# Patient Record
Sex: MALE | Race: WHITE
[De-identification: names, ages, dates, MRNs, and addresses within clinical notes are randomized per-mention and may not be internally consistent; named-entity substitution may affect disease eponyms.]

---

## 2017-11-13 ENCOUNTER — HOSPITAL ENCOUNTER (INPATIENT)
Dept: HOSPITAL 17 - NEPE | Age: 57
LOS: 4 days | Discharge: HOME | DRG: 281 | End: 2017-11-17
Attending: HOSPITALIST | Admitting: HOSPITALIST
Payer: COMMERCIAL

## 2017-11-13 VITALS
DIASTOLIC BLOOD PRESSURE: 103 MMHG | RESPIRATION RATE: 18 BRPM | OXYGEN SATURATION: 95 % | TEMPERATURE: 97.8 F | HEART RATE: 94 BPM | SYSTOLIC BLOOD PRESSURE: 155 MMHG

## 2017-11-13 VITALS — WEIGHT: 271.17 LBS | BODY MASS INDEX: 42.56 KG/M2 | HEIGHT: 67 IN

## 2017-11-13 DIAGNOSIS — R09.89: ICD-10-CM

## 2017-11-13 DIAGNOSIS — I25.82: ICD-10-CM

## 2017-11-13 DIAGNOSIS — I42.0: ICD-10-CM

## 2017-11-13 DIAGNOSIS — I21.4: Primary | ICD-10-CM

## 2017-11-13 DIAGNOSIS — I25.2: ICD-10-CM

## 2017-11-13 DIAGNOSIS — Z95.5: ICD-10-CM

## 2017-11-13 DIAGNOSIS — E78.2: ICD-10-CM

## 2017-11-13 DIAGNOSIS — E11.9: ICD-10-CM

## 2017-11-13 DIAGNOSIS — I25.110: ICD-10-CM

## 2017-11-13 DIAGNOSIS — F17.210: ICD-10-CM

## 2017-11-13 DIAGNOSIS — Z91.14: ICD-10-CM

## 2017-11-13 DIAGNOSIS — I10: ICD-10-CM

## 2017-11-13 DIAGNOSIS — Z91.19: ICD-10-CM

## 2017-11-13 PROCEDURE — 85610 PROTHROMBIN TIME: CPT

## 2017-11-13 PROCEDURE — 84439 ASSAY OF FREE THYROXINE: CPT

## 2017-11-13 PROCEDURE — 85027 COMPLETE CBC AUTOMATED: CPT

## 2017-11-13 PROCEDURE — 84484 ASSAY OF TROPONIN QUANT: CPT

## 2017-11-13 PROCEDURE — 82948 REAGENT STRIP/BLOOD GLUCOSE: CPT

## 2017-11-13 PROCEDURE — 93458 L HRT ARTERY/VENTRICLE ANGIO: CPT

## 2017-11-13 PROCEDURE — 85730 THROMBOPLASTIN TIME PARTIAL: CPT

## 2017-11-13 PROCEDURE — 84100 ASSAY OF PHOSPHORUS: CPT

## 2017-11-13 PROCEDURE — 80061 LIPID PANEL: CPT

## 2017-11-13 PROCEDURE — C1893 INTRO/SHEATH, FIXED,NON-PEEL: HCPCS

## 2017-11-13 PROCEDURE — 84443 ASSAY THYROID STIM HORMONE: CPT

## 2017-11-13 PROCEDURE — 93005 ELECTROCARDIOGRAM TRACING: CPT

## 2017-11-13 PROCEDURE — 96374 THER/PROPH/DIAG INJ IV PUSH: CPT

## 2017-11-13 PROCEDURE — 83880 ASSAY OF NATRIURETIC PEPTIDE: CPT

## 2017-11-13 PROCEDURE — 83036 HEMOGLOBIN GLYCOSYLATED A1C: CPT

## 2017-11-13 PROCEDURE — 83735 ASSAY OF MAGNESIUM: CPT

## 2017-11-13 PROCEDURE — 85025 COMPLETE CBC W/AUTO DIFF WBC: CPT

## 2017-11-13 PROCEDURE — 80053 COMPREHEN METABOLIC PANEL: CPT

## 2017-11-13 PROCEDURE — 71010: CPT

## 2017-11-13 PROCEDURE — 82550 ASSAY OF CK (CPK): CPT

## 2017-11-13 PROCEDURE — C1769 GUIDE WIRE: HCPCS

## 2017-11-13 PROCEDURE — 82552 ASSAY OF CPK IN BLOOD: CPT

## 2017-11-13 PROCEDURE — 99152 MOD SED SAME PHYS/QHP 5/>YRS: CPT

## 2017-11-13 PROCEDURE — 80048 BASIC METABOLIC PNL TOTAL CA: CPT

## 2017-11-13 PROCEDURE — 99153 MOD SED SAME PHYS/QHP EA: CPT

## 2017-11-14 VITALS — HEART RATE: 84 BPM

## 2017-11-14 VITALS
OXYGEN SATURATION: 96 % | SYSTOLIC BLOOD PRESSURE: 139 MMHG | RESPIRATION RATE: 18 BRPM | DIASTOLIC BLOOD PRESSURE: 96 MMHG | HEART RATE: 71 BPM | TEMPERATURE: 98.6 F

## 2017-11-14 VITALS
OXYGEN SATURATION: 100 % | RESPIRATION RATE: 18 BRPM | SYSTOLIC BLOOD PRESSURE: 143 MMHG | HEART RATE: 73 BPM | DIASTOLIC BLOOD PRESSURE: 93 MMHG

## 2017-11-14 VITALS
DIASTOLIC BLOOD PRESSURE: 93 MMHG | RESPIRATION RATE: 18 BRPM | OXYGEN SATURATION: 97 % | HEART RATE: 71 BPM | SYSTOLIC BLOOD PRESSURE: 143 MMHG

## 2017-11-14 VITALS
DIASTOLIC BLOOD PRESSURE: 79 MMHG | OXYGEN SATURATION: 96 % | SYSTOLIC BLOOD PRESSURE: 132 MMHG | HEART RATE: 76 BPM | RESPIRATION RATE: 18 BRPM | TEMPERATURE: 98.4 F

## 2017-11-14 VITALS
HEART RATE: 87 BPM | SYSTOLIC BLOOD PRESSURE: 132 MMHG | DIASTOLIC BLOOD PRESSURE: 74 MMHG | TEMPERATURE: 97.3 F | RESPIRATION RATE: 16 BRPM | OXYGEN SATURATION: 96 %

## 2017-11-14 VITALS
HEART RATE: 77 BPM | SYSTOLIC BLOOD PRESSURE: 95 MMHG | DIASTOLIC BLOOD PRESSURE: 48 MMHG | TEMPERATURE: 99.4 F | OXYGEN SATURATION: 96 % | RESPIRATION RATE: 18 BRPM

## 2017-11-14 VITALS — HEART RATE: 96 BPM

## 2017-11-14 VITALS
DIASTOLIC BLOOD PRESSURE: 84 MMHG | TEMPERATURE: 99.2 F | OXYGEN SATURATION: 94 % | HEART RATE: 79 BPM | SYSTOLIC BLOOD PRESSURE: 127 MMHG | RESPIRATION RATE: 18 BRPM

## 2017-11-14 VITALS
TEMPERATURE: 98.4 F | HEART RATE: 74 BPM | RESPIRATION RATE: 16 BRPM | SYSTOLIC BLOOD PRESSURE: 136 MMHG | OXYGEN SATURATION: 97 % | DIASTOLIC BLOOD PRESSURE: 85 MMHG

## 2017-11-14 VITALS — HEART RATE: 78 BPM

## 2017-11-14 VITALS — HEART RATE: 74 BPM

## 2017-11-14 VITALS — OXYGEN SATURATION: 98 %

## 2017-11-14 VITALS — HEART RATE: 77 BPM

## 2017-11-14 VITALS — HEART RATE: 73 BPM

## 2017-11-14 VITALS — HEART RATE: 97 BPM

## 2017-11-14 VITALS — HEART RATE: 82 BPM

## 2017-11-14 LAB
ANION GAP SERPL CALC-SCNC: 9 MEQ/L (ref 5–15)
APTT BLD: 28 SEC (ref 24.3–30.1)
APTT BLD: 28.7 SEC (ref 24.3–30.1)
APTT BLD: 29.1 SEC (ref 24.3–30.1)
BASOPHILS # BLD AUTO: 0.1 TH/MM3 (ref 0–0.2)
BASOPHILS NFR BLD: 0.8 % (ref 0–2)
BUN SERPL-MCNC: 13 MG/DL (ref 7–18)
CHLORIDE SERPL-SCNC: 103 MEQ/L (ref 98–107)
CK MB SERPL-MCNC: 2.7 NG/ML (ref 0.5–3.6)
CK SERPL-CCNC: 103 U/L (ref 39–308)
CK SERPL-CCNC: 143 U/L (ref 39–308)
CK SERPL-CCNC: 87 U/L (ref 39–308)
EOSINOPHIL # BLD: 0.3 TH/MM3 (ref 0–0.4)
EOSINOPHIL NFR BLD: 3.1 % (ref 0–4)
ERYTHROCYTE [DISTWIDTH] IN BLOOD BY AUTOMATED COUNT: 12.9 % (ref 11.6–17.2)
ERYTHROCYTE [DISTWIDTH] IN BLOOD BY AUTOMATED COUNT: 13 % (ref 11.6–17.2)
GFR SERPLBLD BASED ON 1.73 SQ M-ARVRAT: 99 ML/MIN (ref 89–?)
HCO3 BLD-SCNC: 24.8 MEQ/L (ref 21–32)
HCT VFR BLD CALC: 44.2 % (ref 39–51)
HCT VFR BLD CALC: 47.4 % (ref 39–51)
HDLC SERPL-MCNC: 51.6 MG/DL (ref 40–60)
HEMO FLAGS: (no result)
HEMOGLOBIN A1A: 1.3 %
HEMOGLOBIN A1B: 1.2 %
HEMOGLOBIN AO: 80.3 %
HEMOGLOBIN LA1C: 2.4 %
HEMOGLOBIN P3: 4.2 %
HGB F MFR BLD: 1.5 %
INR PPP: 1 RATIO
LDLC SERPL-MCNC: 111 MG/DL (ref 0–99)
LYMPHOCYTES # BLD AUTO: 3.1 TH/MM3 (ref 1–4.8)
LYMPHOCYTES NFR BLD AUTO: 34.7 % (ref 9–44)
MAGNESIUM SERPL-MCNC: 1.9 MG/DL (ref 1.5–2.5)
MCH RBC QN AUTO: 32.8 PG (ref 27–34)
MCH RBC QN AUTO: 33.6 PG (ref 27–34)
MCHC RBC AUTO-ENTMCNC: 34.2 % (ref 32–36)
MCHC RBC AUTO-ENTMCNC: 34.6 % (ref 32–36)
MCV RBC AUTO: 95.9 FL (ref 80–100)
MCV RBC AUTO: 97.2 FL (ref 80–100)
MONOCYTES NFR BLD: 8.9 % (ref 0–8)
NEUTROPHILS # BLD AUTO: 4.7 TH/MM3 (ref 1.8–7.7)
NEUTROPHILS NFR BLD AUTO: 52.5 % (ref 16–70)
PLATELET # BLD: 280 TH/MM3 (ref 150–450)
PLATELET # BLD: 294 TH/MM3 (ref 150–450)
POTASSIUM SERPL-SCNC: 4.1 MEQ/L (ref 3.5–5.1)
PROTHROMBIN TIME: 11.3 SEC (ref 9.8–11.6)
RBC # BLD AUTO: 4.54 MIL/MM3 (ref 4.5–5.9)
RBC # BLD AUTO: 4.95 MIL/MM3 (ref 4.5–5.9)
REVIEW FLAG: (no result)
SODIUM SERPL-SCNC: 137 MEQ/L (ref 136–145)
WBC # BLD AUTO: 8.7 TH/MM3 (ref 4–11)
WBC # BLD AUTO: 8.9 TH/MM3 (ref 4–11)

## 2017-11-14 RX ADMIN — NITROGLYCERIN SCH INCH: 20 OINTMENT TOPICAL at 12:00

## 2017-11-14 RX ADMIN — INSULIN ASPART SCH: 100 INJECTION, SOLUTION INTRAVENOUS; SUBCUTANEOUS at 21:33

## 2017-11-14 RX ADMIN — Medication SCH ML: at 21:32

## 2017-11-14 RX ADMIN — PHENYTOIN SODIUM SCH MLS/HR: 50 INJECTION INTRAMUSCULAR; INTRAVENOUS at 17:00

## 2017-11-14 RX ADMIN — ATORVASTATIN CALCIUM SCH MG: 10 TABLET, FILM COATED ORAL at 21:33

## 2017-11-14 RX ADMIN — Medication SCH ML: at 09:00

## 2017-11-14 RX ADMIN — NITROGLYCERIN SCH INCH: 20 OINTMENT TOPICAL at 08:00

## 2017-11-14 RX ADMIN — CARVEDILOL SCH MG: 3.12 TABLET, FILM COATED ORAL at 21:33

## 2017-11-14 RX ADMIN — NITROGLYCERIN SCH INCH: 20 OINTMENT TOPICAL at 18:00

## 2017-11-14 RX ADMIN — INSULIN ASPART SCH: 100 INJECTION, SOLUTION INTRAVENOUS; SUBCUTANEOUS at 17:00

## 2017-11-14 RX ADMIN — INSULIN ASPART SCH: 100 INJECTION, SOLUTION INTRAVENOUS; SUBCUTANEOUS at 08:00

## 2017-11-14 RX ADMIN — INSULIN ASPART SCH: 100 INJECTION, SOLUTION INTRAVENOUS; SUBCUTANEOUS at 12:00

## 2017-11-14 RX ADMIN — ENALAPRIL MALEATE SCH MG: 2.5 TABLET ORAL at 21:33

## 2017-11-14 NOTE — EKG
Date Performed: 11/14/2017       Time Performed: 05:53:16

 

PTAGE:      57 years

 

EKG:      Sinus rhythm 

 

 Lateral T wave changes may be due to myocardial ischemia Abnormal ECG

 

PREVIOUS TRACING       : 11/14/2017 00.17 Compared to prior tracing no significant change

 

DOCTOR:   Brendan Cooper  Interpretating Date/Time  11/14/2017 14:01:55

## 2017-11-14 NOTE — EKG
Date Performed: 11/14/2017       Time Performed: 00:17:13

 

PTAGE:      57 years

 

EKG:      Sinus rhythm 

 

 SEPTAL MYOCARDIAL INFARCTION ABNORMAL ECG 

 

NO PREVIOUS TRACING            

 

DOCTOR:   Brendan Cooper  Interpretating Date/Time  11/14/2017 14:05:19

## 2017-11-14 NOTE — PD
HPI


Chief Complaint:  Chest Pain


Time Seen by Provider:  23:53


Travel History


International Travel<30 days:  No


Contact w/Intl Traveler<30days:  No


Traveled to known affect area:  No





History of Present Illness


HPI


This is a 57-year-old male with history of hypertension, diabetes mellitus, 

coronary artery disease, who presents today with points of intermittent chest 

pain with associated shortness of breath.  Patient states that yesterday he did 

heavy exertional activity while moving into a new residence.  He states he felt 

fine however this morning when he woke up, he was having shortness of breath 

and chest pressure.  He reports that his chest pressure around the base of his 

chest.  He states he went to bed last night and woke up with worsening 

shortness of breath.  The patient states he's been off of his medications for 

quite some time.  He states with his new insurance he has an extremely high 

deductible that makes it difficult for him to purchase his meds.





PFSH


Past Medical History


Diabetes:  Yes


Patient Takes Glucophage:  No


Diminished Hearing:  No


Medical other:  Yes (cranioplasy in 87 and blood clot removed )


Integumentary:  Yes (tumor removed between shoulder blade and L lung)


Myocardial Infarction:  Yes (2011)


Tetanus Vaccination:  > 5 Years





Past Surgical History


Abdominal Surgery:  Yes (appendectomy )


Appendectomy:  Yes


Cardiac Surgery:  Yes (1 stent 2011)


Coronary Stent:  Yes (2011)





Social History


Alcohol Use:  Yes (occassionally)


Tobacco Use:  Yes (1ppd)


Substance Use:  No





Allergies-Medications


(Allergen,Severity, Reaction):  


Coded Allergies:  


     No Known Allergies (Unverified , 11/14/17)





Review of Systems


Except as stated in HPI:  all other systems reviewed are Neg


General / Constitutional:  No: Fever, Chills


HENT:  No: Headaches, Neck Pain


Cardiovascular:  Positive: Chest Pain or Discomfort (tightness across the base 

of his), No: Palpitations ( chest bilaterally.), Irregular Rhythm


Respiratory:  Positive: Shortness of Breath, No: Cough


Gastrointestinal:  No: Nausea, Vomiting, Abdominal Pain


Musculoskeletal:  No: Weakness, Pain


Skin:  No Rash, No Itching


Neurologic:  No: Weakness, Dizziness, Headache


Psychiatric:  No: Anxiety, Depression





Physical Exam


Narrative


GENERAL: Well developed well-nourished male in no acute rest her distress


SKIN: Focused skin assessment warm/dry.


HEAD: Atraumatic. Normocephalic. 


EYES:No scleral icterus. No injection or drainage. 


ENT: No nasal bleeding or discharge.  Mucous membranes pink and moist.


NECK: Trachea midline.  Supple.


CARDIOVASCULAR: Regular rate and rhythm.  No murmur appreciated.


RESPIRATORY: No accessory muscle use. Clear to auscultation. Breath sounds 

equal bilaterally. 


GASTROINTESTINAL: Abdomen soft, non-tender, nondistended. Hepatic and splenic 

margins not palpable. 


MUSCULOSKELETAL: No obvious deformities. No clubbing.  No cyanosis.  No edema. 


NEUROLOGICAL: Awake and alert. No obvious cranial nerve deficits.  Motor 

grossly within normal limits. Normal speech.


PSYCHIATRIC: Appropriate mood and affect; insight and judgment normal.





Data


Data


Last Documented VS





Vital Signs








  Date Time  Temp Pulse Resp B/P (MAP) Pulse Ox O2 Delivery O2 Flow Rate FiO2


 


11/14/17 00:23     98 Nasal Cannula 2.00 


 


11/14/17 00:23   25     


 


11/13/17 23:28 97.8 94      








Orders





 Orders


Electrocardiogram (11/13/17 23:53)


Basic Metabolic Panel (Bmp) (11/13/17 23:53)


Ckmb (Isoenzyme) Profile (11/13/17 23:53)


Complete Blood Count With Diff (11/13/17 23:53)


Magnesium (Mg) (11/13/17 23:53)


Prothrombin Time / Inr (Pt) (11/13/17 23:53)


Act Partial Throm Time (Ptt) (11/13/17 23:53)


Troponin I (11/13/17 23:53)


Chest, Single Ap (11/13/17 23:53)


Ecg Monitoring (11/13/17 23:53)


Bilateral Bp Monitoring (11/13/17 23:53)


Iv Access Insert/Monitor (11/13/17 23:53)


Oximetry (11/13/17 23:53)


Oxygen Administration (11/13/17 23:53)


Aspirin Chew (Aspirin Chew) (11/14/17 00:15)


CKMB (11/14/17 00:08)


CKMB% (11/14/17 00:08)


Heparin Infusion SARAH.Q1H (11/14/17 01:33)


Heparin Inj (Heparin Inj) (11/14/17 01:45)


Heparin-D5w 25,000 U/250 Ml (Heparin-D5w (11/14/17 01:45)


Act Partial Throm Time (Ptt) (11/14/17 01:33)


Cbc No Diff, Includes Plts (11/14/17 01:33)


Cbc No Diff, Includes Plts (11/17/17 06:00)


Act Partial Throm Time (Ptt) (11/14/17 08:33)


Occult Blood (Hemoccult) Stool (11/14/17 01:33)


Nitroglycerin 2% Oint (Nitroglycerin 2% (11/14/17 02:00)





Labs





Laboratory Tests








Test


  11/14/17


00:08


 


White Blood Count 8.9 TH/MM3 


 


Red Blood Count 4.95 MIL/MM3 


 


Hemoglobin 16.2 GM/DL 


 


Hematocrit 47.4 % 


 


Mean Corpuscular Volume 95.9 FL 


 


Mean Corpuscular Hemoglobin 32.8 PG 


 


Mean Corpuscular Hemoglobin


Concent 34.2 % 


 


 


Red Cell Distribution Width 13.0 % 


 


Platelet Count 294 TH/MM3 


 


Mean Platelet Volume 7.3 FL 


 


Neutrophils (%) (Auto) 52.5 % 


 


Lymphocytes (%) (Auto) 34.7 % 


 


Monocytes (%) (Auto) 8.9 % 


 


Eosinophils (%) (Auto) 3.1 % 


 


Basophils (%) (Auto) 0.8 % 


 


Neutrophils # (Auto) 4.7 TH/MM3 


 


Lymphocytes # (Auto) 3.1 TH/MM3 


 


Monocytes # (Auto) 0.8 TH/MM3 


 


Eosinophils # (Auto) 0.3 TH/MM3 


 


Basophils # (Auto) 0.1 TH/MM3 


 


CBC Comment DIFF FINAL 


 


Differential Comment  


 


Prothrombin Time 11.3 SEC 


 


Prothromb Time International


Ratio 1.0 RATIO 


 


 


Activated Partial


Thromboplast Time 28.7 SEC 


 


 


Blood Urea Nitrogen 13 MG/DL 


 


Creatinine 0.81 MG/DL 


 


Random Glucose 187 MG/DL 


 


Calcium Level 8.2 MG/DL 


 


Magnesium Level 1.9 MG/DL 


 


Sodium Level 137 MEQ/L 


 


Potassium Level 4.1 MEQ/L 


 


Chloride Level 103 MEQ/L 


 


Carbon Dioxide Level 24.8 MEQ/L 


 


Anion Gap 9 MEQ/L 


 


Estimat Glomerular Filtration


Rate 99 ML/MIN 


 


 


Total Creatine Kinase 143 U/L 


 


Creatine Kinase MB 2.7 NG/ML 


 


Troponin I 0.39 NG/ML 











MDM


Medical Decision Making


Medical Screen Exam Complete:  Yes


Emergency Medical Condition:  Yes


Interpretation(s)





Laboratory Tests








Test


  11/14/17


00:08


 


White Blood Count 8.9 TH/MM3 


 


Red Blood Count 4.95 MIL/MM3 


 


Hemoglobin 16.2 GM/DL 


 


Hematocrit 47.4 % 


 


Mean Corpuscular Volume 95.9 FL 


 


Mean Corpuscular Hemoglobin 32.8 PG 


 


Mean Corpuscular Hemoglobin


Concent 34.2 % 


 


 


Red Cell Distribution Width 13.0 % 


 


Platelet Count 294 TH/MM3 


 


Mean Platelet Volume 7.3 FL 


 


Neutrophils (%) (Auto) 52.5 % 


 


Lymphocytes (%) (Auto) 34.7 % 


 


Monocytes (%) (Auto) 8.9 % 


 


Eosinophils (%) (Auto) 3.1 % 


 


Basophils (%) (Auto) 0.8 % 


 


Neutrophils # (Auto) 4.7 TH/MM3 


 


Lymphocytes # (Auto) 3.1 TH/MM3 


 


Monocytes # (Auto) 0.8 TH/MM3 


 


Eosinophils # (Auto) 0.3 TH/MM3 


 


Basophils # (Auto) 0.1 TH/MM3 


 


CBC Comment DIFF FINAL 


 


Differential Comment  


 


Prothrombin Time 11.3 SEC 


 


Prothromb Time International


Ratio 1.0 RATIO 


 


 


Activated Partial


Thromboplast Time 28.7 SEC 


 


 


Blood Urea Nitrogen 13 MG/DL 


 


Creatinine 0.81 MG/DL 


 


Random Glucose 187 MG/DL 


 


Calcium Level 8.2 MG/DL 


 


Magnesium Level 1.9 MG/DL 


 


Sodium Level 137 MEQ/L 


 


Potassium Level 4.1 MEQ/L 


 


Chloride Level 103 MEQ/L 


 


Carbon Dioxide Level 24.8 MEQ/L 


 


Anion Gap 9 MEQ/L 


 


Estimat Glomerular Filtration


Rate 99 ML/MIN 


 


 


Total Creatine Kinase 143 U/L 


 


Creatine Kinase MB 2.7 NG/ML 


 


Troponin I 0.39 NG/ML 








Last 24 hours Impressions








Chest X-Ray 11/13/17 5990 Signed





Impressions: 





 Service Date/Time:  Tuesday, November 14, 2017 00:10 - CONCLUSION:  Mild 





 pulmonary vascular congestion. Surgical clips overlie left chest. Some bony 





 hypertrophy of the right AC joint.     Jesse Segundo MD 








Differential Diagnosis


ACS versus musculoskeletal pain versus dyspnea versus COPD


Narrative Course


This is a 61-year-old male history coronary artery disease, hypertension, 

diabetes mellitus, who is noncompliant with his medications secondary to cost, 

presents today with complaint of day and a half history of shortness of breath.

  The patient also having some chest tightness across the base of his chest.  

The patient has no acute ST elevation or depression on his EKG.  Troponin is 

elevated at 0.39.  He's been given a full aspirin.  He's been started on a 

heparin drip.  The bit into the nitroglycerin placed based on his anterior 

chest wall.  Call was made to Dr. Cruz for admission for a non-ST elevation 

MI.  He'll be admitted to the intermediate floor and telemetry.





Diagnosis





 Primary Impression:  


 Non-ST elevation myocardial infarction (NSTEMI)


 Additional Impressions:  


 Diabetes mellitus


 Hypertension


 Tobacco use





Admitting Information


Admitting Physician Requests:  Admit











Kurt Noonan MD Nov 14, 2017 00:08
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
## 2017-11-14 NOTE — MB
cc:

LYNDON DAUGHERTY

****

 

 

DATE OF CONSULTATION

11/14/17

 

REASON FOR CONSULTATION

Unstable angina.

 

HISTORY OF PRESENT ILLNESS

The patient is a 57-year-old white male with a history of hypertension,

hyperlipidemia, diabetes, coronary artery disease status post myocardial

infarction and coronary stent placement 2011 in [place] who presented to the

hospital with chest pain.  On the day of admission, while doing minimal to mild

exertion, he developed severe substernal chest discomfort described as

"pressure" associated with severe shortness of breath.  The episode lasted

about 30 minutes but  recurred a short time later, again associated with severe

shortness of breath.  He finally decided to go to the emergency room for

further evaluation and treatment.  The second episode of chest discomfort also

lasted about 30-40 minutes.  He denies pleurisy, dizziness, syncope,

near-syncope, palpitations, pedal edema, paroxysmal nocturnal dyspnea.

 

PAST MEDICAL HISTORY

As above.  No other details currently available.

 

PAST SURGICAL HISTORY

1.  Appendectomy

2.  Benign tumor removed from the shoulder.

 

MEDICATIONS

At home none.

 

Here in hospital

1.  Aspirin 325 mg p.o. daily.

2.  Carvedilol 3.125 mg p.o. b.i.d.

3.  Atorvastatin 10 mg p.o. q.h.s.

4.  Nitro paste 1 inch q.6 h.

5.  Heparin drip.

 

ALLERGIES

NO KNOWN DRUG ALLERGIES.

 

FAMILY HISTORY

Noncontributory.   There is no significant family history of early myocardial

infarction.

 

SOCIAL HISTORY

The patient smokes about a pack of cigarettes per day.  He denies drug or

alcohol abuse.

 

REVIEW OF SYSTEMS

As in the history of present illness, otherwise, negative or noncontributory.

He also denies headache, visual changes, numbness, abdominal pain, melena,

dyspepsia, bright red blood per rectum.

 

PHYSICAL EXAMINATION

VITAL SIGNS:  Blood pressure 132/74 with a pulse of 97, respirations 16.

GENERAL:  He is a well-developed, well-nourished white male in no acute

distress

HEENT: Jugular venous pressure is very hard to assess.  Carotid pulses are 2+

bilaterally and without bruits.

CHEST:  Examination of the chest reveals clear lung fields.

CARDIAC:  He has a regular rhythm and rate without S3-S4 or murmur.

ABDOMEN:  He has a soft, obese, nontender abdomen.  Bowel sounds are present.

There is no definite hepatosplenomegaly.  EXTREMITIES:  No clubbing, cyanosis

or edema.  Peripheral pulses are normal throughout.

 

LABORATORY DATA

Normal CBC. Normal basic metabolic profile except for glucose 187. Troponin

0.39, , total cholesterol 209, , HDL 52, triglycerides 233.

 

IMAGING STUDIES

Chest x-ray shows mild pulmonary vascular congestion.

 

CARDIOLOGY STUDIES

EKG shows sinus rhythm, septal infarct age undetermined.

 

IMPRESSION

Symptoms most suggestive of unstable angina in this 57-year-old white male with

a history of known coronary artery disease status post myocardial infarction

and coronary stenting several years ago, history of hypertension, diabetes,

hyperlipidemia.  His chest pain symptoms on the day of admission clearly are

suggestive of angina in an unstable pattern, occurring with minimal exertion.

Troponin levels are also slightly abnormal.  EKGs overall show non-diagnostic 
lateral

T-wave changes.  He has been chest pain free since coming into hospital.  Chest

x-ray also suggests the possibility of slight to mild congestive heart failure.

Echocardiogram is pending.

 

RECOMMENDATIONS

1.  Cardiac catheterization tomorrow.

2.  Continue statin therapy.

3.  Continue beta blocker therapy and add an ACE inhibitor.

4.  Continue daily aspirin and heparin drip.

 

 

 

                              _________________________________

                              MD DANIELA Ambrocio/

D:  11/14/2017/5:02 PM

T:  11/14/2017/5:09 PM

Visit #:  [id number]

Job #:  [id number]

MTDD

## 2017-11-14 NOTE — RADRPT
EXAM DATE/TIME:  11/14/2017 00:10 

 

[facility] COMPARISON:     

No previous studies available for comparison.

 

                     

INDICATIONS :     

Chest pain and short of breath.

                     

 

MEDICAL HISTORY :     

None.          

 

SURGICAL HISTORY :        

Stent placement.

 

ENCOUNTER:     

Initial                                        

 

ACUITY:     

1 day      

 

PAIN SCORE:     

4/10

 

LOCATION:     

Bilateral chest 

 

FINDINGS:     

A single view of the chest demonstrates the lungs to be symmetrically aerated without evidence of mas
s, infiltrate or effusion. Mild pulmonary vascular prominence. Surgical clips overlie the left chest 
The cardiomediastinal contours are unremarkable.  Osseous structures are intact.

 

CONCLUSION:     

Mild pulmonary vascular congestion. Surgical clips overlie left chest. Some bony hypertrophy of the r
ight AC joint.

 

 

 

 Jesse Segundo MD on November 14, 2017 at 0:23           

Board Certified Radiologist.

 This report was verified electronically.

## 2017-11-14 NOTE — HHI.PR
Subjective


Remarks


Follow-up non-ST elevation MI


11/14/17-patient seen and examined and currently he is free of chest pain





Objective


Vitals





Vital Signs








  Date Time  Temp Pulse Resp B/P (MAP) Pulse Ox O2 Delivery O2 Flow Rate FiO2


 


11/14/17 09:00  77      


 


11/14/17 08:00  74      


 


11/14/17 07:00 98.4 74 16 136/85 (102) 97   


 


11/14/17 07:00  72      


 


11/14/17 06:00  74      


 


11/14/17 05:00  73      


 


11/14/17 04:00  73      


 


11/14/17 03:46        


 


11/14/17 03:40 98.6 71 18 139/96 (110) 96   


 


11/14/17 03:40  71      


 


11/14/17 02:19  71 18 143/93 (110) 97 Nasal Cannula 2.00 


 


11/14/17 02:14  73 18 143/93 (110) 100 Nasal Cannula 2.00 


 


11/14/17 00:23     98 Nasal Cannula 2.00 


 


11/14/17 00:23     97 Nasal Cannula 2.00 


 


11/14/17 00:23   25  100 Nasal Cannula 2.00 


 


11/13/17 23:28 97.8 94 18 155/103 (120) 95 Room Air  














I/O      


 


 11/13/17 11/13/17 11/13/17 11/14/17 11/14/17 11/14/17





 07:00 15:00 23:00 07:00 15:00 23:00


 


Intake Total    40 ml  


 


Output Total    400 ml  


 


Balance    -360 ml  


 


      


 


Intake Oral    0 ml  


 


IV Total    40 ml  


 


Output Urine Total    400 ml  


 


# Bowel Movements    0  








Result Diagram:  


11/14/17 0940                                                                  

              11/14/17 0008





Imaging





Last Impressions








Chest X-Ray 11/13/17 8554 Signed





Impressions: 





 Service Date/Time:  Tuesday, November 14, 2017 00:10 - CONCLUSION:  Mild 





 pulmonary vascular congestion. Surgical clips overlie left chest. Some bony 





 hypertrophy of the right AC joint.     Jesse Segundo MD 








Objective Remarks


GENERAL: NAD


SKIN: Warm and dry.


HEAD: Normocephalic.


EYES: No scleral icterus. No injection or drainage. 


NECK: Supple, trachea midline. No JVD or lymphadenopathy.


CARDIOVASCULAR: Regular rate and rhythm without murmurs, gallops, or rubs. 


RESPIRATORY: Breath sounds equal bilaterally. No accessory muscle use.


GASTROINTESTINAL: Abdomen soft, non-tender, nondistended. 


MUSCULOSKELETAL: No cyanosis, or edema. 


BACK: Nontender without obvious deformity. No CVA tenderness.








A/P


Problem List:  


(1) Non-ST elevation myocardial infarction (NSTEMI)


ICD Code:  I21.4 - Non-ST elevation (NSTEMI) myocardial infarction


Status:  Acute


(2) Hypertension


ICD Code:  I10 - Essential (primary) hypertension


Status:  Acute


(3) Diabetes mellitus


ICD Code:  E11.9 - Type 2 diabetes mellitus without complications


Status:  Acute


(4) Tobacco use


ICD Code:  Z72.0 - Tobacco use


Status:  Acute


Assessment and Plan


57-year-old man with





1.  NSTEMI


Continue with ACS rule out including serial troponin/EKG 


Currently on Heparin drip, Nitropaste, beta blocker, statin, aspirin


Cardiology consulted for possible evaluation for left heart catheterization





2.  Shortness of breath


Likely secondary to NSTEMI


Chest x-ray significant for mild pulmonary vascular congestion


Status post IV Lasix 1


Supplemental oxygen





3.  Diabetes mellitus


Not currently on any medication


A1c pending


Continue with SSI





4.  Hypertension


Start Coreg twice a day





5. Hyperlipidemia





Start Lipitor at bedtime











Chuck Kolb MD Nov 14, 2017 10:58

## 2017-11-15 VITALS
SYSTOLIC BLOOD PRESSURE: 116 MMHG | OXYGEN SATURATION: 94 % | RESPIRATION RATE: 20 BRPM | TEMPERATURE: 98.9 F | HEART RATE: 79 BPM | DIASTOLIC BLOOD PRESSURE: 72 MMHG

## 2017-11-15 VITALS — HEART RATE: 76 BPM

## 2017-11-15 VITALS
DIASTOLIC BLOOD PRESSURE: 79 MMHG | OXYGEN SATURATION: 94 % | SYSTOLIC BLOOD PRESSURE: 127 MMHG | HEART RATE: 75 BPM | TEMPERATURE: 97.9 F | RESPIRATION RATE: 20 BRPM

## 2017-11-15 VITALS
TEMPERATURE: 99.1 F | DIASTOLIC BLOOD PRESSURE: 73 MMHG | HEART RATE: 76 BPM | OXYGEN SATURATION: 96 % | SYSTOLIC BLOOD PRESSURE: 120 MMHG | RESPIRATION RATE: 18 BRPM

## 2017-11-15 VITALS
HEART RATE: 72 BPM | RESPIRATION RATE: 20 BRPM | SYSTOLIC BLOOD PRESSURE: 137 MMHG | TEMPERATURE: 98.7 F | OXYGEN SATURATION: 95 % | DIASTOLIC BLOOD PRESSURE: 98 MMHG

## 2017-11-15 VITALS — HEART RATE: 72 BPM

## 2017-11-15 VITALS
SYSTOLIC BLOOD PRESSURE: 126 MMHG | HEART RATE: 75 BPM | TEMPERATURE: 98.3 F | OXYGEN SATURATION: 96 % | RESPIRATION RATE: 20 BRPM | DIASTOLIC BLOOD PRESSURE: 88 MMHG

## 2017-11-15 VITALS
RESPIRATION RATE: 20 BRPM | TEMPERATURE: 98.3 F | OXYGEN SATURATION: 100 % | SYSTOLIC BLOOD PRESSURE: 120 MMHG | DIASTOLIC BLOOD PRESSURE: 88 MMHG | HEART RATE: 88 BPM

## 2017-11-15 VITALS — HEART RATE: 66 BPM

## 2017-11-15 VITALS — HEART RATE: 70 BPM

## 2017-11-15 VITALS — HEART RATE: 74 BPM

## 2017-11-15 VITALS — HEART RATE: 64 BPM

## 2017-11-15 VITALS — HEART RATE: 78 BPM

## 2017-11-15 VITALS — HEART RATE: 92 BPM

## 2017-11-15 VITALS — HEART RATE: 68 BPM

## 2017-11-15 VITALS — HEART RATE: 63 BPM

## 2017-11-15 VITALS — HEART RATE: 88 BPM

## 2017-11-15 LAB
ANION GAP SERPL CALC-SCNC: 5 MEQ/L (ref 5–15)
APTT BLD: 26.6 SEC (ref 24.3–30.1)
APTT BLD: 29.2 SEC (ref 24.3–30.1)
APTT BLD: 30.2 SEC (ref 24.3–30.1)
BASOPHILS # BLD AUTO: 0.1 TH/MM3 (ref 0–0.2)
BASOPHILS NFR BLD: 0.7 % (ref 0–2)
BUN SERPL-MCNC: 15 MG/DL (ref 7–18)
CHLORIDE SERPL-SCNC: 102 MEQ/L (ref 98–107)
EOSINOPHIL # BLD: 0.4 TH/MM3 (ref 0–0.4)
EOSINOPHIL NFR BLD: 4 % (ref 0–4)
ERYTHROCYTE [DISTWIDTH] IN BLOOD BY AUTOMATED COUNT: 12.6 % (ref 11.6–17.2)
GFR SERPLBLD BASED ON 1.73 SQ M-ARVRAT: 81 ML/MIN (ref 89–?)
HCO3 BLD-SCNC: 28.9 MEQ/L (ref 21–32)
HCT VFR BLD CALC: 44.1 % (ref 39–51)
HEMO FLAGS: (no result)
LYMPHOCYTES # BLD AUTO: 3.4 TH/MM3 (ref 1–4.8)
LYMPHOCYTES NFR BLD AUTO: 36.1 % (ref 9–44)
MCH RBC QN AUTO: 33.1 PG (ref 27–34)
MCHC RBC AUTO-ENTMCNC: 34.1 % (ref 32–36)
MCV RBC AUTO: 96.9 FL (ref 80–100)
MONOCYTES NFR BLD: 8.7 % (ref 0–8)
NEUTROPHILS # BLD AUTO: 4.8 TH/MM3 (ref 1.8–7.7)
NEUTROPHILS NFR BLD AUTO: 50.5 % (ref 16–70)
PLATELET # BLD: 277 TH/MM3 (ref 150–450)
POTASSIUM SERPL-SCNC: 4 MEQ/L (ref 3.5–5.1)
RBC # BLD AUTO: 4.55 MIL/MM3 (ref 4.5–5.9)
SODIUM SERPL-SCNC: 136 MEQ/L (ref 136–145)
WBC # BLD AUTO: 9.5 TH/MM3 (ref 4–11)

## 2017-11-15 PROCEDURE — B2151ZZ FLUOROSCOPY OF LEFT HEART USING LOW OSMOLAR CONTRAST: ICD-10-PCS

## 2017-11-15 PROCEDURE — 4A023N7 MEASUREMENT OF CARDIAC SAMPLING AND PRESSURE, LEFT HEART, PERCUTANEOUS APPROACH: ICD-10-PCS

## 2017-11-15 PROCEDURE — B2111ZZ FLUOROSCOPY OF MULTIPLE CORONARY ARTERIES USING LOW OSMOLAR CONTRAST: ICD-10-PCS

## 2017-11-15 RX ADMIN — INSULIN ASPART SCH: 100 INJECTION, SOLUTION INTRAVENOUS; SUBCUTANEOUS at 12:00

## 2017-11-15 RX ADMIN — ASPIRIN SCH MG: 325 TABLET ORAL at 08:31

## 2017-11-15 RX ADMIN — INSULIN ASPART SCH: 100 INJECTION, SOLUTION INTRAVENOUS; SUBCUTANEOUS at 17:00

## 2017-11-15 RX ADMIN — NITROGLYCERIN SCH INCH: 20 OINTMENT TOPICAL at 12:00

## 2017-11-15 RX ADMIN — Medication SCH ML: at 21:46

## 2017-11-15 RX ADMIN — INSULIN ASPART SCH: 100 INJECTION, SOLUTION INTRAVENOUS; SUBCUTANEOUS at 21:00

## 2017-11-15 RX ADMIN — NITROGLYCERIN SCH INCH: 20 OINTMENT TOPICAL at 06:00

## 2017-11-15 RX ADMIN — Medication SCH ML: at 08:32

## 2017-11-15 RX ADMIN — NITROGLYCERIN SCH INCH: 20 OINTMENT TOPICAL at 00:00

## 2017-11-15 RX ADMIN — ATORVASTATIN CALCIUM SCH MG: 10 TABLET, FILM COATED ORAL at 21:46

## 2017-11-15 RX ADMIN — ENALAPRIL MALEATE SCH MG: 2.5 TABLET ORAL at 08:31

## 2017-11-15 RX ADMIN — CARVEDILOL SCH MG: 3.12 TABLET, FILM COATED ORAL at 21:46

## 2017-11-15 RX ADMIN — PHENYTOIN SODIUM SCH MLS/HR: 50 INJECTION INTRAMUSCULAR; INTRAVENOUS at 03:00

## 2017-11-15 RX ADMIN — INSULIN ASPART SCH: 100 INJECTION, SOLUTION INTRAVENOUS; SUBCUTANEOUS at 08:00

## 2017-11-15 RX ADMIN — CARVEDILOL SCH MG: 3.12 TABLET, FILM COATED ORAL at 08:32

## 2017-11-15 RX ADMIN — ENALAPRIL MALEATE SCH MG: 2.5 TABLET ORAL at 21:46

## 2017-11-15 NOTE — CATHPROC
U[facility]E HIS Report

Study Information

Study Number    Admission           Scheduled Start             Study Start

 

[id number]       Nov 14 2017 1:53AM      11/14/2017                Nov 15 2017 2:27PM

 

[place] [facility]

 

Cardiac Catheterization

 

Admit Source               Facility Department

 

Other                   [facility] - Cath Lab

 

Physician and Clinical Staff

Initial Kamari Graff           Circulator     Rosalinda Pierce,RN

 

Additional Kamari Graff           Recorder      Kassie Cortez,RT(R)

 

                         Scrub        LulingJody lam,RT(R) (BS)

 

Procedures Performed

Procedure                     Location (Site)           Vessel Name

 

Angiogram LV                   LV                 Ventricle

Coronary Angiograms                 LCA                Left Coronary

Coronary Angiograms                 RCA                Right Coronary

L Heart Cath

Equipment

Time            Description           Size      Mfg Part Number  Used/Scraped

                   TRANSDUCER, TRUWAVE              [id number]

14:51    HERNANDEZ RICHARD                      *                Used

                   W/STOCKCOCK                  *[id number]

                                           534-618T



                                           *[id number]

                                           534-650S



                                           *[id number]

                                           [id number]

14:51    [employer]       SYRINGE, ANGIOMAT 150ML     150ML     *[id number] Used

                                           2S

       MEDICAL [employer]CEPT     DRAPE, RADIAL FEMORAL FULL

14:51                                 *        *[id number] Used

       DEVELOPMENT       BODY

                                           [id number]

14:51    [employer]    PACK, CCL CUSTOM        *                Used

                                           *[id number]

14:51    [employer]    SUPPORT, ARTERIAL ADULT            [id number] *[id number] Used

                                           [id number]

14:51    [employer]      PEN, SKIN DUAL W/ RULER     *                Used

                                           *[id number]

                   BAND, RADIAL COMPRESSION TR          [id number]

15:22    [employer]                      29CM               Used

                   LARGE 29                    *[id number]

                   SHEATH, FR6 RADIAL PRELUDE

14:51    [employer]                      FR 6      [id number]   Used

                   EASE 11CM

                                           [id number]

14:51    [employer]      WIRE, EXCHANGE 260CM 3MMJ    260CM              Used

                                           *[id number]

                                           [id number]

14:51    [employer]          MANIFOLD, 4 PORT        *                Used

                                           *[id number]

                                           [id number]

15:22    [employer]          TUBING, HIGH PRESSURE 20"    20"               Used

                                           *[id number]

14:51    [employer]         OMNIPAQUE, 350 MG, 150ML    150ML     [id number]      Used

 

15:15    [employer]         OMNIPAQUE, 350 MG, 50ML     50ML      [id number]      Used

                                           [id number]

14:51    [employer]      BLANKET,WARM AIR CCL      *                Used

                                           *[id number]

                   CATHETER, FR5 OPTITORQUE            

15:08    [employer]                      FR 5               Used

                   RADIAL TIG 4.0                 *[id number]

 

History: Current Medications

Medication         Dosage/Unit       Route     Frequency  Last Date/Time Taken

 

VASOTEC

 

LIPITOR

 

Beta Blocker

 

ASA

 

 

History: Allergies

Allergy              Reaction

 

NKDA

History: Risk Factors

         Family History of

Hypertension               Previous MI     Previous Heart Failure

         Premature CAD

Yes        No           No         No

Prior Valve

         Prior PCI        Prior PCIDate       Prior CABG

Surgery

No        Yes           01/01/2011        No

         Cerebrovascular     Peripheral Artery     Chronic Lung

On Dialysis                                      Diabetes   Diabetes Therapy

         Disease         Disease          Disease

No        No           No            No         Yes      Oral

 

 

History: Stress Tests

Stress or Imaging Studies Performed

 

No

 

 

History: Other Disease Selection Items

CAD

 

 

History: Other

Current Smoker     Method     Packs a Day       Years Used           Pack Years

 

Yes          Cigarettes   1            43               43

 

Labs

Hgb (g/dl)       Hct (%)         WBC (l/cumm)        Platelets (thousands)

 

11.60-17.00      35.00-51.00       4.00-11.00         150..00

 

15.1          44.1          9.5             277

 

Glucose (mg/dl)    BUN (mg/dl)       Creatinine (mg/dl)    BUN:Creatinine (1:x)

74..00      7.00-18.00       0.50-1.30         10.00-20.00

 

193          15           0.9            16.7

 

Na (meq/l)       K (meq/l)

 

136..00     3.50-5.10

 

136          4

 

INR (PTT:PT)

 

0.90-1.10

 

1

 

CPK-MB (ng/ML)

 

0.50-3.60

 

Not Drawn

 

 

 

 

Medication

Medication Total Dose (Bolus/Oral)

Medication              Total Dosage/Unit

 

1% XYLOCAINE                20 mL

 

RADIAL COCKTAIL               5 mL (Bolus)

 

VERSED                   2 mg

 

Medications (Bolus/Oral)

Medication          Time Given          Dosage/Unit       Administered By      Reason

 

VERSED            11/15/2017 3:00:00 PM      2 mg         Rosalinda Pierce

2 mg VERSED given in lab by Rosalinda Pierce, RN in Left Forearm via Peripheral IV. Ordered by Kamari Bonds.

 

1% XYLOCAINE         11/15/2017 3:00:11 PM      20 mL         Kamari Bonds

20 mL 1% XYLOCAINE given in lab by Kamari Bonds in Right Radial via Subcutaneous.

 

                                                       Ntg 200mcg Verapamil 2.5mg Heparin

RADIAL COCKTAIL       11/15/2017 3:06:31 PM      5 mL (Bolus)     Kamari Bonds

                                                       2500U

5 mL (Bolus) RADIAL COCKTAIL given in lab by Kamari Bonds in Right Radial via Radial. Using [Solution
 Name]. Reason: Ntg 200mcg Verapamil

2.5mg Heparin 2500U.

 

Medication (Drip)

Medication          Time Given          Dosage/Unit      Concentration/Unit  Diluent (ml)  Solution

 

IV Solutions         11/15/2017 2:51:40 PM      50 mL (IV)                        NaCl .9

IV Solutions given in lab by Rosalinda Pierce RN in Left Forearm via Peripheral IV. Pump/Drip Flow us
ing NaCl .9. Ordered by Kamari Bonds.

Initial Case Assessment

Cardiovascular

HR           Rhythm          NIBP           Chest Pain

 

72           SR            127/96          0

 

Edema Present      Skin color            Skin

 

None           Normal              Warm

 

Circulatory - Right Pulses

Dorsalis Pedis     Femoral         Radial

 

2            2            2

 

Scale (0,1,2,3,4,d)

 

Scale (0,1,2,3,4,d)

 

Neurological State

            Oriented to time-place-

Alert                      Moves all extremities

            person

 

Respiration - General

Respiration Rate

            SpO2 (%)         O2 (lpm)

(B/min)

19           96            2

 

Final Case Assessment

Cardiovascular

HR           Rhythm          NIBP           Chest Pain

 

75           SR            120/78          0

 

Edema Present      Skin color            Skin

 

None           Normal              Warm

 

Circulatory - Right Pulses

Dorsalis Pedis     Femoral         Radial

 

2            2            2

 

Scale (0,1,2,3,4,d)

 

Scale (0,1,2,3,4,d)

 

Neurological State

            Oriented to time-place-

Alert                      Moves all extremities

            person

 

Respiration - General

Respiration Rate

            SpO2 (%)         O2 (lpm)

(B/min)

19           96            2

 

Chronological Log

Time    Study Chronological Log

 

14:37:24  Patient arrived via Bed.

 

14:40:31  Patient Name, D.O.B, / Armband Verified By R.N.

 

14:40:34  Consent signed by the physician and the patient and verified by the Cath Lab staff.

14:40:36  Pre-op and post- op instructions given; patient acknowledges understanding of instructions.


 

14:50:43  Immediate Presedation assesment performed by physician.

 

14:50:49  Patient has been NPO for More than 6Hrs.

 

14:50:50  Skin Breakdown- none

 

14:51:00  A # 18 IV was noted in the Antecubital (right). Grade = 0

 

14:51:00  A # 18 IV was noted in the Forearm (left). Grade = 0

      IV Solutions given in lab by Rosalinda Pierce, RN in Left Forearm via Peripheral IV. Pump/Drip F
low using NaCl .9.

14:51:40

      Ordered by Kamari Bonds.

14:52:07  Pressure channel 1 zeroed.

 

14:52:19  History and physical on the chart or being dictated.

      Assessment: Initial Case, HR=72 BPM, Rhythm=SR, UEKG=302/96 mmhg, Chest Pain=0, Edema=None, Col
or=Normal,

      Skin = Warm

14:52:21  Right Pulses: Tucker Ped=2, Femoral=2, Radial=2

      Neurological: State=Alert, Ox3, [place]

      Respiration: Resp=19 B/min, SpO2=96 %, O2=2 lpm

      Vitals capture started with the following parameters, Patient=Adult, Interval=5 min, Initial Pr
uypwjr=991 mmHg,

14:52:35

      Deflation Rate=5 mmHg, Cuff placed on Left Arm

14:53:10  HR=71 bpm, DUXD=437/96 mmhg, SpO2=96.0 %, Resp=18 B/min, Pain=0, [place]=10, [place]=2

 

14:54:05  MD arrived.

 

14:58:07  HR=71 bpm, ODOJ=974/95 mmhg, SpO2=96.0 %, Resp=16 B/min

      Time Out. Correct patient, correct procedure, correct physician, power injector loaded, or not 
loaded with contrast with

14:59:13

      surgical team present. Time Out Concurred by MD and individual staff in procedure.

15:00:00  2 mg VERSED given in lab by Rosalinda Pierce, RN in Left Forearm via Peripheral IV. Ordered 
by Kamari Bonds.

 

15:00:08  Case Start

 

15:00:11  20 mL 1% XYLOCAINE given in lab by Kamari Bonds in Right Radial via Subcutaneous.

 

15:03:10  HR=75 bpm, XEZU=833/89 mmhg, SpO2=95.0 %, Resp=19 B/min

 

15:04:35  Access site was Right Radial Artery.

      A SHEATH, FR6 RADIAL PRELUDE EASE 11CM FR 6 was advanced into the Radial (right) using the Perc
utaneous

15:04:50

      technique.

      5 mL (Bolus) RADIAL COCKTAIL given in lab by Kamari Bonds in Right Radial via Radial. Using [So
lution Name]. Reason:

15:06:31

      Ntg 200mcg Verapamil 2.5mg Heparin 2500U.

15:07:01  Reference ECG taken

      A CATHETER, FR5 OPTITORQUE RADIAL TIG 4.0 FR 5 was advanced over a wire. OMNIPAQUE, 350 MG, 150
ML 150ML

15:07:33

      was used for injections.

15:08:11  HR=76 bpm, AZXR=950/80 mmhg, SpO2=92 %, Resp=21 B/min

 

15:08:37  The  LCA was injected and visualized at various angles. OMNIPAQUE, 350 MG, 150ML 150ML used
.

      Recorded Pressure: Ao, HR=76, Condition=Condition 1

15:08:57

      (Aorta) Ao 115/85/99

15:11:16  The  RCA was injected and visualized at various angles. OMNIPAQUE, 350 MG, 150ML 150ML used
.

      After removing the current catheter a JL 3.5 INFINITI CATHETER FR 6 was advanced over a WIRE, E
XCHANGE 260CM

15:12:27

      3MMJ 260CM.

15:13:10  HR=83 bpm, JSSS=986/77 mmhg, SpO2=91.0 %, Resp=18 B/min

      After removing the current catheter a PIGTAIL STR INFINITI CATHETER FR 6 was advanced over a WI
RE, EXCHANGE

15:16:41

      260CM 3MMJ 260CM.

15:18:09  HR=86 bpm, ROWH=261/85 mmhg, SpO2=95.0 %, Resp=17 B/min

      Recorded Pressure: LV, HR=78, Condition=Condition 1

15:18:22

      (Left Ventricle) /23/35

15:20:30  The LV was injected at 12 cc/sec for a total of 42. OMNIPAQUE, 350 MG, 50ML 50ML used.

       Recorded Pressure: LV, Ao, HR=81, Condition=Condition 1

15:20:48   (Left Ventricle) /24/42,

       (Aorta) Ao 113/79/95

15:21:03   Catheter was removed

 

15:21:06   Case End

       Radial Compression Device Used. 10 mLs of air placed in BAND, RADIAL COMPRESSION TR LARGE 29 2
9CM. Affected

15:21:54

       hand 96 % O2 saturation.

15:22:43   No case complications noted.

 

15:22:46   Cine recording checked.

 

15:22:47   Bedside Report will be given.

 

15:22:49   Contrast Scanned

 

15:22:52   A Left Heart Cath was performed.

       Assessment: Final Case, HR=75 BPM, Rhythm=SR, NNUH=202/78 mmhg, Chest Pain=0, Edema=None, [place]
r=Normal,

       Skin = Warm

15:23:02   Right Pulses: Tucker Ped=2, Femoral=2, Radial=2

       Neurological: State=Alert, Ox3, [place]

       Respiration: Resp=19 B/min, SpO2=96 %, O2=2 lpm

15:23:10   HR=76 bpm, EAPK=090/78 mmhg, SpO2=96.0 %, Resp=18 B/min

 

15:26:52   Vitals capture stopped.

 

15:33:00   Patient moved to stretcher

 

 

 

 

End Study - Contrast Media Used In Study

Contrast       Total Opened (mL)    Total Used (mL)      Total Wasted (mL)

 

Omnipaque       115           115            0

 

End Study - Maximum Contrast Load

Max Contrast Load (mL)

 

688.9

 

End Study - Radiation Exposure

Fluoro Time

(minutes)

3.7

 

 

End Study - Patient Disposition

Complications     Transferred To        Interventional Outcome

 

No           Telemetry Bed         No attempt made

## 2017-11-15 NOTE — HHI.PR
Subjective


Remarks


57-year-old male with a past medical history significant for hypertension and 

type 2 diabetes mellitus who presents with increasing shortness of breath and 

chest tightness 1 day.  The patient reports that he was having shortness of 

breath and chest pressure when he woke up this morning.  His shortness of 

breath was relieved with lying down however when he woke up to take a shower to 

go to work, the pain and shortness of breath returned.  He was previously 

stented in 2011 in [place], however has not been on any medications in quite 

some time.  He carries a diagnosis of hypertension and diabetes however does 

not take any medications because he cannot afford these.  He last saw PCP 

approximately 3 years ago.  Troponin on admission was 0.39.  EKG with no ST 

segment elevations or depressions.  Chest x-ray significant for pulmonary 

vascular congestion.





11/14/17-patient seen and examined and currently he is free of chest pain








11-15 NO CHEST PAIN


DW RN AND PT


TO HAVE CARDIAC CATH LATER TODAY


AM LABS





Objective


Vitals





Vital Signs








  Date Time  Temp Pulse Resp B/P (MAP) Pulse Ox O2 Delivery O2 Flow Rate FiO2


 


11/15/17 09:00  68      


 


11/15/17 08:00  66      


 


11/15/17 07:00  68      


 


11/15/17 07:00 98.3 75 20 126/88 (101) 96   


 


11/15/17 06:00  72      


 


11/15/17 05:00  78      


 


11/15/17 04:00  72      


 


11/15/17 03:00  76      


 


11/15/17 03:00 99.1 76 18 120/73 (89) 96   


 


11/15/17 02:00  74      


 


11/15/17 01:00  72      


 


11/15/17 00:00  72      


 


11/14/17 23:00  77      


 


11/14/17 23:00 99.4 77 18 95/48 (64) 96   


 


11/14/17 22:00  84      


 


11/14/17 21:00  84      


 


11/14/17 20:00 99.2 79 18 127/84 (98) 94   


 


11/14/17 20:00  80      


 


11/14/17 19:00  82      


 


11/14/17 18:06  96      


 


11/14/17 16:00  97      


 


11/14/17 15:00  87      


 


11/14/17 15:00 97.3 87 16 132/74 (93) 96   


 


11/14/17 14:05  84      


 


11/14/17 13:00  84      


 


11/14/17 12:00  84      


 


11/14/17 11:00  69      


 


11/14/17 11:00 98.4 76 18 132/79 (96) 96   














I/O      


 


 11/14/17 11/14/17 11/14/17 11/15/17 11/15/17 11/15/17





 07:00 15:00 23:00 07:00 15:00 23:00


 


Intake Total 40 ml  682 ml 370 ml  


 


Output Total 400 ml  850 ml 1625 ml  


 


Balance -360 ml  -168 ml -1255 ml  


 


      


 


Intake Oral 0 ml  640 ml 240 ml  


 


IV Total 40 ml  42 ml 130 ml  


 


Output Urine Total 400 ml  850 ml 1625 ml  


 


# Bowel Movements 0  0 1  








Result Diagram:  


11/15/17 0605                                                                  

              11/15/17 0605





Other Results





 Laboratory Tests








Test


  11/14/17


00:08 11/14/17


06:03 11/14/17


09:40 11/14/17


13:30


 


White Blood Count 8.9 TH/MM3   8.7 TH/MM3  


 


Red Blood Count 4.95 MIL/MM3   4.54 MIL/MM3  


 


Hemoglobin 16.2 GM/DL   15.3 GM/DL  


 


Hematocrit 47.4 %   44.2 %  


 


Mean Corpuscular Volume 95.9 FL   97.2 FL  


 


Mean Corpuscular Hemoglobin 32.8 PG   33.6 PG  


 


Mean Corpuscular Hemoglobin


Concent 34.2 % 


  


  34.6 % 


  


 


 


Red Cell Distribution Width 13.0 %   12.9 %  


 


Platelet Count 294 TH/MM3   280 TH/MM3  


 


Mean Platelet Volume 7.3 FL   7.4 FL  


 


Neutrophils (%) (Auto) 52.5 %    


 


Lymphocytes (%) (Auto) 34.7 %    


 


Monocytes (%) (Auto) 8.9 %    


 


Eosinophils (%) (Auto) 3.1 %    


 


Basophils (%) (Auto) 0.8 %    


 


Neutrophils # (Auto) 4.7 TH/MM3    


 


Lymphocytes # (Auto) 3.1 TH/MM3    


 


Monocytes # (Auto) 0.8 TH/MM3    


 


Eosinophils # (Auto) 0.3 TH/MM3    


 


Basophils # (Auto) 0.1 TH/MM3    


 


CBC Comment DIFF FINAL    


 


Differential Comment     


 


Prothrombin Time 11.3 SEC    


 


Prothromb Time International


Ratio 1.0 RATIO 


  


  


  


 


 


Activated Partial


Thromboplast Time 28.7 SEC 


  


  29.1 SEC 


  


 


 


Blood Urea Nitrogen 13 MG/DL    


 


Creatinine 0.81 MG/DL    


 


Random Glucose 187 MG/DL    


 


Calcium Level 8.2 MG/DL    


 


Magnesium Level 1.9 MG/DL    


 


Sodium Level 137 MEQ/L    


 


Potassium Level 4.1 MEQ/L    


 


Chloride Level 103 MEQ/L    


 


Carbon Dioxide Level 24.8 MEQ/L    


 


Anion Gap 9 MEQ/L    


 


Estimat Glomerular Filtration


Rate 99 ML/MIN 


  


  


  


 


 


Total Creatine Kinase 143 U/L  103 U/L   87 U/L 


 


Creatine Kinase MB 2.7 NG/ML    


 


Troponin I 0.39 NG/ML  0.39 NG/ML   0.33 NG/ML 


 


B-Type Natriuretic Peptide 95 PG/ML    


 


Triglycerides Level  233 MG/DL   


 


Cholesterol Level  209 MG/DL   


 


LDL Cholesterol  111 MG/DL   


 


HDL Cholesterol  51.6 MG/DL   


 


Cholesterol/HDL Ratio  4.05 RATIO   


 


Hemoglobin A1c   8.8 %  


 


Test


  11/14/17


15:37 11/14/17


23:48 11/15/17


06:05 


 


 


Activated Partial


Thromboplast Time 28.0 SEC 


  29.2 SEC 


  30.2 SEC 


  


 


 


White Blood Count   9.5 TH/MM3  


 


Red Blood Count   4.55 MIL/MM3  


 


Hemoglobin   15.1 GM/DL  


 


Hematocrit   44.1 %  


 


Mean Corpuscular Volume   96.9 FL  


 


Mean Corpuscular Hemoglobin   33.1 PG  


 


Mean Corpuscular Hemoglobin


Concent 


  


  34.1 % 


  


 


 


Red Cell Distribution Width   12.6 %  


 


Platelet Count   277 TH/MM3  


 


Mean Platelet Volume   7.3 FL  


 


Neutrophils (%) (Auto)   50.5 %  


 


Lymphocytes (%) (Auto)   36.1 %  


 


Monocytes (%) (Auto)   8.7 %  


 


Eosinophils (%) (Auto)   4.0 %  


 


Basophils (%) (Auto)   0.7 %  


 


Neutrophils # (Auto)   4.8 TH/MM3  


 


Lymphocytes # (Auto)   3.4 TH/MM3  


 


Monocytes # (Auto)   0.8 TH/MM3  


 


Eosinophils # (Auto)   0.4 TH/MM3  


 


Basophils # (Auto)   0.1 TH/MM3  


 


CBC Comment   DIFF FINAL  


 


Differential Comment     


 


Blood Urea Nitrogen   15 MG/DL  


 


Creatinine   0.96 MG/DL  


 


Random Glucose   193 MG/DL  


 


Calcium Level   8.3 MG/DL  


 


Sodium Level   136 MEQ/L  


 


Potassium Level   4.0 MEQ/L  


 


Chloride Level   102 MEQ/L  


 


Carbon Dioxide Level   28.9 MEQ/L  


 


Anion Gap   5 MEQ/L  


 


Estimat Glomerular Filtration


Rate 


  


  81 ML/MIN 


  


 








Imaging





Last Impressions








Chest X-Ray 11/13/17 4487 Signed





Impressions: 





 Service Date/Time:  Tuesday, November 14, 2017 00:10 - CONCLUSION:  Mild 





 pulmonary vascular congestion. Surgical clips overlie left chest. Some bony 





 hypertrophy of the right AC joint.     Jesse Segundo MD 








Objective Remarks


GENERAL: Awake alert oriented 3 --talkative and cooperative


SKIN: Warm and dry.


HEAD: Atraumatic. Normocephalic. 


EYES: Pupils equal and round. No scleral icterus. No injection or drainage.  

Extraocular muscles intact


ENT: No nasal bleeding or discharge.  Mucous membranes pink and moist.  Tongue 

is midline


NECK: Trachea midline. No JVD.  Neck is supple


CARDIOVASCULAR: Regular rate and rhythm.  S1-S2 no S3-S4 no heave or thrill or 

rub or gallop


RESPIRATORY: No accessory muscle use. Clear to auscultation. Breath sounds 

equal bilaterally. 


GASTROINTESTINAL: Abdomen soft, non-tender, nondistended. Hepatic and splenic 

margins not palpable.  Obese


MUSCULOSKELETAL: Extremities without clubbing, cyanosis, or edema. No obvious 

deformities. 


NEUROLOGICAL: Awake and alert. No obvious cranial nerve deficits.  Motor 

grossly within normal limits. Five out of 5 muscle strength in the arms and 

legs.  Normal speech.


PSYCHIATRIC: Appropriate mood and affect; insight and judgment normal.


Medications and IVs





Current Medications


Aspirin (Aspirin Chew) 324 mg ONCE  ONCE CHEW  Last administered on 11/14/17at 

00:22;  Start 11/14/17 at 00:15;  Stop 11/14/17 at 00:17;  Status DC


Heparin Sodium (Porcine) (Heparin Inj) 4,000 units ONCE  ONCE IV  Last 

administered on 11/14/17at 01:45;  Start 11/14/17 at 01:45;  Stop 11/14/17 at 01

:46;  Status DC


Heparin Sodium/ Dextrose 250 ml @  10 mls/hr TITRATE  PRN IV Coagulation 

Management Last administered on 11/14/17at 02:12;  Start 11/14/17 at 01:45;  

Stop 11/14/17 at 07:52;  Status DC


Nitroglycerin (Nitroglycerin 2% Oint) 1 inch ONCE  ONCE TOPICAL  Last 

administered on 11/14/17at 02:32;  Start 11/14/17 at 02:00;  Stop 11/14/17 at 02

:01;  Status DC


Furosemide (Lasix Inj) 20 mg ONCE  ONCE IV PUSH  Last administered on 11/14/ 17at 02:32;  Start 11/14/17 at 02:15;  Stop 11/14/17 at 02:16;  Status DC


Sodium Chloride (NS Flush) 2 ml BID IV FLUSH  Last administered on 11/15/17at 08

:32;  Start 11/14/17 at 09:00


Sodium Chloride (NS Flush) 2 ml UNSCH  PRN IV FLUSH FLUSH AFTER USING IV ACCESS

;  Start 11/14/17 at 02:15


Aspirin (Aspirin) 325 mg DAILY PO  Last administered on 11/15/17at 08:31;  

Start 11/15/17 at 09:00


Nitroglycerin (Nitroglycerin 2% Oint) 1 inch Q6HR TOP ;  Start 11/14/17 at 08:00


Acetaminophen (Tylenol) 500 mg Q4H  PRN PO HEADACHE;  Start 11/14/17 at 02:15


Morphine Sulfate (Morphine Inj) 2 mg Q3HR  PRN IV PUSH PAIN SCALE 6 TO 10;  

Start 11/14/17 at 02:15


Dextrose (D50w (Vial) Inj) 50 ml UNSCH  PRN IV PUSH HYPOGLYCEMIA-SEE COMMENTS;  

Start 11/14/17 at 04:45


Glucagon (Glucagon Inj) 1 mg UNSCH  PRN OTHER HYPOGLYCEMIA-SEE COMMENTS;  Start 

11/14/17 at 04:45


Insulin Aspart (NovoLOG SUPPLEMENTAL SCALE) 1 ACHS SLIDING  SCALE SQ  Last 

administered on 11/14/17at 21:33;  Start 11/14/17 at 08:00


Heparin Sodium/ Dextrose 250 ml @  10 mls/hr TITRATE  PRN IV Coagulation 

Management Last administered on 11/15/17at 03:58;  Start 11/14/17 at 08:00


Carvedilol (Coreg) 3.125 mg Q12HR PO  Last administered on 11/15/17at 08:32;  

Start 11/14/17 at 21:00


Atorvastatin Calcium (Lipitor) 10 mg HS PO  Last administered on 11/14/17at 21:

33;  Start 11/14/17 at 21:00


Enalapril Maleate (Vasotec) 2.5 mg BID PO  Last administered on 11/15/17at 08:31

;  Start 11/14/17 at 21:00


Sodium Chloride 1,000 ml @  100 mls/hr Q10H IV ;  Start 11/14/17 at 17:00;  

Stop 11/19/17 at 16:59


Diphenhydramine HCl (Benadryl) 50 mg ON  CALL PO ;  Start 11/14/17 at 17:00;  

Stop 11/18/17 at 16:59


Diazepam (Valium) 10 mg ON  CALL PO ;  Start 11/14/17 at 17:00;  Stop 11/18/17 

at 16:59





A/P


Problem List:  


(1) Non-ST elevation myocardial infarction (NSTEMI)


ICD Code:  I21.4 - Non-ST elevation (NSTEMI) myocardial infarction


Status:  Acute


(2) Hypertension


ICD Code:  I10 - Essential (primary) hypertension


Status:  Acute


(3) Diabetes mellitus


ICD Code:  E11.9 - Type 2 diabetes mellitus without complications


Status:  Acute


(4) Tobacco use


ICD Code:  Z72.0 - Tobacco use


Status:  Acute


Assessment and Plan


Continue with ACS rule out including serial troponin/EKG 


Currently on Heparin drip, Nitropaste, beta blocker, statin, aspirin


Cardiology consulted for possible evaluation for left heart catheterization 

later today


Currently chest pain-free








2.  Shortness of breath


Likely secondary to NSTEMI


Chest x-ray significant for mild pulmonary vascular congestion


Status post IV Lasix 1


Supplemental oxygen





3.  Diabetes mellitus


Not currently on any medication


A1c pending


Continue with SSI





4.  Hypertension


Start Coreg twice a day





5. Hyperlipidemia





Start Lipitor at bedtime





Malignant noncompliance since patient has not seen a physician in many years 

even though he has insurance


Discharge Planning


Cardiac catheterization later today 





further recommendations pending











Donte Mclean DO Nov 15, 2017 10:35

## 2017-11-15 NOTE — PD.CARD.PN
Subjective


Subjective Remarks


Denies further CP.  No dyspnea, dizziness, palpitations, PND.





Objective


Medications











Item Value  Date Time


 


Aspirin 325 mg 11/15/17 0900





 (Aspirin) DAILY/PO 11/15/17 0831


 


Carvedilol 3.125 mg 11/14/17 2100





 (Coreg) Q12HR/PO 11/15/17 0832


 


Atorvastatin 10 mg 11/14/17 2100





Calcium HS/PO 11/14/17 2133





 (Lipitor)  


 


Enalapril Maleate 2.5 mg 11/14/17 2100





 (Vasotec) BID/PO 11/15/17 0831


 


Nitroglycerin 1 inch 11/14/17 0800





 (Nitroglycerin Q6HR/TOP 





2% Oint)  


 


Heparin Sodium/ 250 ml @ 10 mls/hr 11/14/17 0800





Dextrose TITRATE  PRN/IV 11/15/17 0358











Current Medications








 Medications


  (Trade)  Dose


 Ordered  Sig/Maximo


 Route  Start Time


 Stop Time Status Last Admin


 


  (NS Flush)  2 ml  BID


 IV FLUSH  11/14/17 09:00


    11/15/17 08:32


 


 


  (NS Flush)  2 ml  UNSCH  PRN


 IV FLUSH  11/14/17 02:15


     


 


 


  (Aspirin)  325 mg  DAILY


 PO  11/15/17 09:00


    11/15/17 08:31


 


 


  (Nitroglycerin


 2% Oint)  1 inch  Q6HR


 TOP  11/14/17 08:00


     


 


 


  (Tylenol)  500 mg  Q4H  PRN


 PO  11/14/17 02:15


     


 


 


  (Morphine Inj)  2 mg  Q3HR  PRN


 IV PUSH  11/14/17 02:15


     


 


 


  (D50w (Vial) Inj)  50 ml  UNSCH  PRN


 IV PUSH  11/14/17 04:45


     


 


 


  (Glucagon Inj)  1 mg  UNSCH  PRN


 OTHER  11/14/17 04:45


     


 


 


  (NovoLOG


 SUPPLEMENTAL


 SCALE)  1  ACHS SLIDING  SCALE


 SQ  11/14/17 08:00


    11/14/17 21:33


 


 


 Heparin Sodium/


 Dextrose  250 ml @ 


 10 mls/hr  TITRATE  PRN


 IV  11/14/17 08:00


    11/15/17 03:58


 


 


  (Coreg)  3.125 mg  Q12HR


 PO  11/14/17 21:00


    11/15/17 08:32


 


 


  (Lipitor)  10 mg  HS


 PO  11/14/17 21:00


    11/14/17 21:33


 


 


  (Vasotec)  2.5 mg  BID


 PO  11/14/17 21:00


    11/15/17 08:31


 


 


 Sodium Chloride  1,000 ml @ 


 100 mls/hr  Q10H


 IV  11/14/17 17:00


 11/19/17 16:59   


 


 


  (Benadryl)  50 mg  ON  CALL


 PO  11/14/17 17:00


 11/18/17 16:59   


 


 


  (Valium)  10 mg  ON  CALL


 PO  11/14/17 17:00


 11/18/17 16:59   


 








Vital Signs / I&O





Vital Signs








  Date Time  Temp Pulse Resp B/P (MAP) Pulse Ox O2 Delivery O2 Flow Rate FiO2


 


11/15/17 12:00  63      


 


11/15/17 11:00 97.9 75 20 127/79 (95) 94   


 


11/15/17 11:00  68      


 


11/15/17 10:00  70      


 


11/15/17 09:00  68      


 


11/15/17 08:00  66      


 


11/15/17 07:00  68      


 


11/15/17 07:00 98.3 75 20 126/88 (101) 96   


 


11/15/17 06:00  72      


 


11/15/17 05:00  78      


 


11/15/17 04:00  72      


 


11/15/17 03:00  76      


 


11/15/17 03:00 99.1 76 18 120/73 (89) 96   


 


11/15/17 02:00  74      


 


11/15/17 01:00  72      


 


11/15/17 00:00  72      


 


11/14/17 23:00  77      


 


11/14/17 23:00 99.4 77 18 95/48 (64) 96   


 


11/14/17 22:00  84      


 


11/14/17 21:00  84      


 


11/14/17 20:00 99.2 79 18 127/84 (98) 94   


 


11/14/17 20:00  80      


 


11/14/17 19:00  82      


 


11/14/17 18:06  96      


 


11/14/17 16:00  97      














I/O      


 


 11/14/17 11/14/17 11/14/17 11/15/17 11/15/17 11/15/17





 07:00 15:00 23:00 07:00 15:00 23:00


 


Intake Total 40 ml  682 ml 370 ml  


 


Output Total 400 ml  850 ml 1625 ml  


 


Balance -360 ml  -168 ml -1255 ml  


 


      


 


Intake Oral 0 ml  640 ml 240 ml  


 


IV Total 40 ml  42 ml 130 ml  


 


Output Urine Total 400 ml  850 ml 1625 ml  


 


# Bowel Movements 0  0 1  








Physical Exam


GENERAL: Well developed, well nourished. No acute distress.


HEENT: Jugular venous pressure very hard to assess.


CHEST: Lungs clear to auscultation bilaterally. Unlabored respiratory effort.


CARDIAC: Regular rate and rhythm without S3, S4, or murmur.


ABDOMEN: Soft, nontender, no hepatosplenomegaly. Bowel sounds present.


EXTREMITIES: No clubbing, cyanosis, or edema.


Laboratory





Laboratory Tests








Test


  11/14/17


15:37 11/14/17


23:48 11/15/17


06:05


 


Activated Partial


Thromboplast Time 28.0 SEC 


  29.2 SEC 


  30.2 SEC 


 


 


White Blood Count   9.5 TH/MM3 


 


Red Blood Count   4.55 MIL/MM3 


 


Hemoglobin   15.1 GM/DL 


 


Hematocrit   44.1 % 


 


Mean Corpuscular Volume   96.9 FL 


 


Mean Corpuscular Hemoglobin   33.1 PG 


 


Mean Corpuscular Hemoglobin


Concent 


  


  34.1 % 


 


 


Red Cell Distribution Width   12.6 % 


 


Platelet Count   277 TH/MM3 


 


Mean Platelet Volume   7.3 FL 


 


Neutrophils (%) (Auto)   50.5 % 


 


Lymphocytes (%) (Auto)   36.1 % 


 


Monocytes (%) (Auto)   8.7 % 


 


Eosinophils (%) (Auto)   4.0 % 


 


Basophils (%) (Auto)   0.7 % 


 


Neutrophils # (Auto)   4.8 TH/MM3 


 


Lymphocytes # (Auto)   3.4 TH/MM3 


 


Monocytes # (Auto)   0.8 TH/MM3 


 


Eosinophils # (Auto)   0.4 TH/MM3 


 


Basophils # (Auto)   0.1 TH/MM3 


 


CBC Comment   DIFF FINAL 


 


Differential Comment    


 


Blood Urea Nitrogen   15 MG/DL 


 


Creatinine   0.96 MG/DL 


 


Random Glucose   193 MG/DL 


 


Calcium Level   8.3 MG/DL 


 


Sodium Level   136 MEQ/L 


 


Potassium Level   4.0 MEQ/L 


 


Chloride Level   102 MEQ/L 


 


Carbon Dioxide Level   28.9 MEQ/L 


 


Anion Gap   5 MEQ/L 


 


Estimat Glomerular Filtration


Rate 


  


  81 ML/MIN 


 











Assessment and Plan


Problem List:  


(1) CAD (coronary artery disease)


ICD Codes:  I25.10 - Atherosclerotic heart disease of native coronary artery 

without angina pectoris


Plan:  Stable overnight.  Cath shows likely chronic total occlusion of proximal 

left circumflex with fair to good RCA to circumflex collateral, mild to 

moderate non-obstructive disease in LAD and RCA.





REC medical therapy, continue beta blocker, ACE-I, aspirin, add oral nitrate





(2) Dilated cardiomyopathy


ICD Codes:  I42.0 - Dilated cardiomyopathy


Status:  Chronic


Plan:  Possible mild CHF on admission.  EF 15-20% by cath.  Suspect 

cardiomyopathy is predominantly non-ischemic in origin.





REC continue carvedilol, enalapril


         echo in 90 days to reassess EF, consider ICD implant if EF still < 35%


         will consider LifeVest





(3) Hyperlipidemia


ICD Codes:  E78.5 - Hyperlipidemia, unspecified


Plan:  Suboptimal lipid profile with .  Continue statin.





(4) Hypertension


ICD Codes:  I10 - Essential (primary) hypertension


Status:  Acute


Plan:  Stable.  Normotensive.





Code Status


full code


Discussed Condition With


patient's family





Problem Qualifiers





(1) CAD (coronary artery disease):  


Qualified Codes:  I25.110 - Atherosclerotic heart disease of native coronary 

artery with unstable angina pectoris


(2) Hyperlipidemia:  


Qualified Codes:  E78.2 - Mixed hyperlipidemia


(3) Hypertension:  


Qualified Codes:  I10 - Essential (primary) hypertension








Kamari Bonds MD Nov 15, 2017 15:37

## 2017-11-15 NOTE — MA
cc:

LYNDON DAUGHERTY M.D.

****

 

 

DATE: 11/15/2017

 

PROCEDURE

Left heart catheterization, selective coronary angiography, left

ventriculography.

 

PROCEDURE NOTE

The patient was brought to the cardiac catheterization laboratory in a fasting

state after having signed informed consent.  The right radial region was

prepped and draped as per policy and anesthetized with 1% lidocaine.  Arterial

access was obtained via the right radial artery and a 6-Yi sheath placed.

Coronary arteriography was performed using a Tiger catheter.  Additional shots

of the left system were taken with a Deya left 3.5.  Left ventriculography

was done using a standard 6-Yi pigtail.

There were no apparent immediate complications.  A radial artery compression

band was applied to his right wrist at the end of the case to achieve good

hemostasis.

 

HEMODYNAMIC DATA

Left ventricle 120 with an end-diastolic pressure of 18.  Aorta 113/79 with a

mean of 95.  There was no significant transvalvular aortic gradient on pullback

of the pigtail catheter.

 

CORONARY ARTERIOGRAPHY

The left main is normal.  The left anterior descending is diffusely diseased.

There is likely up to 30% stenosis in the distal third of the proximal LAD.

The mid-LAD has probably up to 40% stenosis diffusely right after the takeoff

of a fairly large branching diagonal.  This diagonals more medial branch, which

is smaller, has up to 40% proximal stenosis.  The mid to distal LAD otherwise

has mild diffuse disease up to 20% severity.  Left circumflex appears to be

totally occluded proximally.  There are fair to good right coronary to obtuse

marginal collaterals.  The right coronary artery is a very large dominant

vessel which is diffusely diseased.  There is likely up to 30% stenosis in the

proximal and midportions and up to 40% stenosis.

 

LEFT VENTRICULOGRAPHY

Contrast injection of the left ventricle reveals severe global hypokinesis.

Ejection fraction is estimated at 15-20%.

 

CONCLUSION

 

1. Severe single-vessel coronary artery disease, a totally occluded but

     collateralized left circumflex.

2. Severely reduced left ventricular systolic function with ejection fraction

     estimated at 15-20%.

 

 

 

                              _________________________________

                              MD DANIELA Ambrocio/DUTCH

D:  11/15/2017/3:33 PM

T:  11/15/2017/3:42 PM

Visit #:  [id number]

Job #:  [id number]

SIELA

## 2017-11-16 VITALS
SYSTOLIC BLOOD PRESSURE: 126 MMHG | DIASTOLIC BLOOD PRESSURE: 79 MMHG | OXYGEN SATURATION: 95 % | RESPIRATION RATE: 18 BRPM | HEART RATE: 68 BPM | TEMPERATURE: 98.3 F

## 2017-11-16 VITALS
TEMPERATURE: 98.2 F | DIASTOLIC BLOOD PRESSURE: 82 MMHG | OXYGEN SATURATION: 97 % | SYSTOLIC BLOOD PRESSURE: 122 MMHG | HEART RATE: 70 BPM | RESPIRATION RATE: 16 BRPM

## 2017-11-16 VITALS
HEART RATE: 71 BPM | OXYGEN SATURATION: 96 % | DIASTOLIC BLOOD PRESSURE: 75 MMHG | SYSTOLIC BLOOD PRESSURE: 124 MMHG | TEMPERATURE: 98.4 F | RESPIRATION RATE: 18 BRPM

## 2017-11-16 VITALS — HEART RATE: 69 BPM

## 2017-11-16 VITALS — HEART RATE: 68 BPM

## 2017-11-16 VITALS
OXYGEN SATURATION: 98 % | DIASTOLIC BLOOD PRESSURE: 86 MMHG | RESPIRATION RATE: 18 BRPM | TEMPERATURE: 97.6 F | HEART RATE: 69 BPM | SYSTOLIC BLOOD PRESSURE: 144 MMHG

## 2017-11-16 VITALS — HEART RATE: 66 BPM

## 2017-11-16 VITALS
OXYGEN SATURATION: 95 % | RESPIRATION RATE: 18 BRPM | SYSTOLIC BLOOD PRESSURE: 137 MMHG | DIASTOLIC BLOOD PRESSURE: 78 MMHG | TEMPERATURE: 98.4 F | HEART RATE: 73 BPM

## 2017-11-16 VITALS
TEMPERATURE: 98.9 F | SYSTOLIC BLOOD PRESSURE: 122 MMHG | HEART RATE: 72 BPM | RESPIRATION RATE: 20 BRPM | OXYGEN SATURATION: 95 % | DIASTOLIC BLOOD PRESSURE: 76 MMHG

## 2017-11-16 VITALS — HEART RATE: 70 BPM

## 2017-11-16 VITALS — HEART RATE: 63 BPM

## 2017-11-16 VITALS — HEART RATE: 73 BPM

## 2017-11-16 LAB
ALP SERPL-CCNC: 85 U/L (ref 45–117)
ALT SERPL-CCNC: 37 U/L (ref 12–78)
ANION GAP SERPL CALC-SCNC: 9 MEQ/L (ref 5–15)
AST SERPL-CCNC: 14 U/L (ref 15–37)
BASOPHILS # BLD AUTO: 0 TH/MM3 (ref 0–0.2)
BASOPHILS NFR BLD: 0.5 % (ref 0–2)
BILIRUB SERPL-MCNC: 0.4 MG/DL (ref 0.2–1)
BUN SERPL-MCNC: 13 MG/DL (ref 7–18)
CHLORIDE SERPL-SCNC: 102 MEQ/L (ref 98–107)
EOSINOPHIL # BLD: 0.2 TH/MM3 (ref 0–0.4)
EOSINOPHIL NFR BLD: 2.5 % (ref 0–4)
ERYTHROCYTE [DISTWIDTH] IN BLOOD BY AUTOMATED COUNT: 12.7 % (ref 11.6–17.2)
GFR SERPLBLD BASED ON 1.73 SQ M-ARVRAT: 101 ML/MIN (ref 89–?)
HCO3 BLD-SCNC: 23.6 MEQ/L (ref 21–32)
HCT VFR BLD CALC: 45 % (ref 39–51)
HEMO FLAGS: (no result)
HEMOGLOBIN A1A: 1.3 %
HEMOGLOBIN A1B: 1.2 %
HEMOGLOBIN AO: 80.4 %
HEMOGLOBIN LA1C: 2.5 %
HEMOGLOBIN P3: 4.1 %
HGB F MFR BLD: 1.5 %
LYMPHOCYTES # BLD AUTO: 3 TH/MM3 (ref 1–4.8)
LYMPHOCYTES NFR BLD AUTO: 31.7 % (ref 9–44)
MAGNESIUM SERPL-MCNC: 2 MG/DL (ref 1.5–2.5)
MCH RBC QN AUTO: 33.5 PG (ref 27–34)
MCHC RBC AUTO-ENTMCNC: 34.8 % (ref 32–36)
MCV RBC AUTO: 96.2 FL (ref 80–100)
MONOCYTES NFR BLD: 10.2 % (ref 0–8)
NEUTROPHILS # BLD AUTO: 5.1 TH/MM3 (ref 1.8–7.7)
NEUTROPHILS NFR BLD AUTO: 55.1 % (ref 16–70)
PLATELET # BLD: 276 TH/MM3 (ref 150–450)
POTASSIUM SERPL-SCNC: 3.8 MEQ/L (ref 3.5–5.1)
RBC # BLD AUTO: 4.67 MIL/MM3 (ref 4.5–5.9)
SODIUM SERPL-SCNC: 135 MEQ/L (ref 136–145)
T4 FREE SERPL-MCNC: 0.73 NG/DL (ref 0.76–1.46)
WBC # BLD AUTO: 9.3 TH/MM3 (ref 4–11)

## 2017-11-16 RX ADMIN — CARVEDILOL SCH MG: 3.12 TABLET, FILM COATED ORAL at 21:26

## 2017-11-16 RX ADMIN — ENALAPRIL MALEATE SCH MG: 2.5 TABLET ORAL at 21:26

## 2017-11-16 RX ADMIN — INSULIN ASPART SCH: 100 INJECTION, SOLUTION INTRAVENOUS; SUBCUTANEOUS at 17:00

## 2017-11-16 RX ADMIN — Medication SCH ML: at 08:05

## 2017-11-16 RX ADMIN — PHENYTOIN SODIUM SCH MLS/HR: 50 INJECTION INTRAMUSCULAR; INTRAVENOUS at 19:00

## 2017-11-16 RX ADMIN — ASPIRIN SCH MG: 325 TABLET ORAL at 08:01

## 2017-11-16 RX ADMIN — ATORVASTATIN CALCIUM SCH MG: 10 TABLET, FILM COATED ORAL at 21:26

## 2017-11-16 RX ADMIN — INSULIN ASPART SCH: 100 INJECTION, SOLUTION INTRAVENOUS; SUBCUTANEOUS at 08:00

## 2017-11-16 RX ADMIN — INSULIN ASPART SCH: 100 INJECTION, SOLUTION INTRAVENOUS; SUBCUTANEOUS at 12:00

## 2017-11-16 RX ADMIN — ISOSORBIDE MONONITRATE SCH MG: 30 TABLET, EXTENDED RELEASE ORAL at 06:25

## 2017-11-16 RX ADMIN — ENALAPRIL MALEATE SCH MG: 2.5 TABLET ORAL at 08:02

## 2017-11-16 RX ADMIN — INSULIN ASPART SCH: 100 INJECTION, SOLUTION INTRAVENOUS; SUBCUTANEOUS at 21:00

## 2017-11-16 RX ADMIN — Medication SCH ML: at 21:26

## 2017-11-16 RX ADMIN — CARVEDILOL SCH MG: 3.12 TABLET, FILM COATED ORAL at 08:01

## 2017-11-16 NOTE — HHI.PR
Subjective


Remarks


57-year-old male with a past medical history significant for hypertension and 

type 2 diabetes mellitus who presents with increasing shortness of breath and 

chest tightness 1 day.  The patient reports that he was having shortness of 

breath and chest pressure when he woke up this morning.  His shortness of 

breath was relieved with lying down however when he woke up to take a shower to 

go to work, the pain and shortness of breath returned.  He was previously 

stented in 2011 in [place], however has not been on any medications in quite 

some time.  He carries a diagnosis of hypertension and diabetes however does 

not take any medications because he cannot afford these.  He last saw PCP 

approximately 3 years ago.  Troponin on admission was 0.39.  EKG with no ST 

segment elevations or depressions.  Chest x-ray significant for pulmonary 

vascular congestion.





11/14/17-patient seen and examined and currently he is free of chest pain








11-15 NO CHEST PAIN


DW RN AND PT


TO HAVE CARDIAC CATH LATER TODAY


AM LABS





11-16  CATH SHOWED LOW EF 15-20 WILL NEED LIFE VEST BEFORE DISCHARGE


MILLIE LAURENT AND PATIENT AND RN


NEEDS TO STOP SMOKING


WILL NEED FOLLOWUPS AFTER DC


WORKS AT [employer]


DENIES CHEST PAIN OR PALPITATIONS AT THIS TIME





Objective


Vitals





Vital Signs








  Date Time  Temp Pulse Resp B/P (MAP) Pulse Ox O2 Delivery O2 Flow Rate FiO2


 


11/16/17 14:00  69      


 


11/16/17 13:00  68      


 


11/16/17 12:00  73      


 


11/16/17 11:00  71      


 


11/16/17 11:00 98.4 71 18 124/75 (91) 96   


 


11/16/17 10:00  70      


 


11/16/17 09:00  68      


 


11/16/17 08:00  68      


 


11/16/17 07:00 98.3 68 18 126/79 (95) 95   


 


11/16/17 07:00  68      


 


11/16/17 06:00  68      


 


11/16/17 05:00  68      


 


11/16/17 04:00  68      


 


11/16/17 03:00  70      


 


11/16/17 03:00 98.2 69 16 122/82 (95) 97   


 


11/16/17 02:00  63      


 


11/16/17 01:00  68      


 


11/16/17 00:00  68      


 


11/15/17 23:00  72      


 


11/15/17 23:00 98.7 71 20 137/98 (111) 95   


 


11/15/17 22:00  92      


 


11/15/17 21:00  72      


 


11/15/17 20:00  76      


 


11/15/17 19:00  70      


 


11/15/17 19:00 98.9 79 20 116/72 (87) 94   


 


11/15/17 18:00  76      


 


11/15/17 17:00  74      


 


11/15/17 16:00  88      


 


11/15/17 15:47  74      


 


11/15/17 15:00 98.3 70 20 120/88 (99) 100   


 


11/15/17 15:00  88      














I/O      


 


 11/15/17 11/15/17 11/15/17 11/16/17 11/16/17 11/16/17





 07:00 15:00 23:00 07:00 15:00 23:00


 


Intake Total 370 ml  1010 ml 600 ml 500 ml 


 


Output Total 1625 ml  1000 ml 550 ml  


 


Balance -1255 ml  10 ml 50 ml 500 ml 


 


      


 


Intake Oral 240 ml  480 ml 600 ml  


 


IV Total 130 ml  530 ml  500 ml 


 


Output Urine Total 1625 ml  1000 ml 550 ml  


 


# Bowel Movements 1   1  








Result Diagram:  


11/16/17 0607                                                                  

              11/16/17 0607





Other Results





 Laboratory Tests








Test


  11/14/17


00:08 11/14/17


06:03 11/14/17


09:40 11/14/17


13:30


 


White Blood Count 8.9 TH/MM3   8.7 TH/MM3  


 


Red Blood Count 4.95 MIL/MM3   4.54 MIL/MM3  


 


Hemoglobin 16.2 GM/DL   15.3 GM/DL  


 


Hematocrit 47.4 %   44.2 %  


 


Mean Corpuscular Volume 95.9 FL   97.2 FL  


 


Mean Corpuscular Hemoglobin 32.8 PG   33.6 PG  


 


Mean Corpuscular Hemoglobin


Concent 34.2 % 


  


  34.6 % 


  


 


 


Red Cell Distribution Width 13.0 %   12.9 %  


 


Platelet Count 294 TH/MM3   280 TH/MM3  


 


Mean Platelet Volume 7.3 FL   7.4 FL  


 


Neutrophils (%) (Auto) 52.5 %    


 


Lymphocytes (%) (Auto) 34.7 %    


 


Monocytes (%) (Auto) 8.9 %    


 


Eosinophils (%) (Auto) 3.1 %    


 


Basophils (%) (Auto) 0.8 %    


 


Neutrophils # (Auto) 4.7 TH/MM3    


 


Lymphocytes # (Auto) 3.1 TH/MM3    


 


Monocytes # (Auto) 0.8 TH/MM3    


 


Eosinophils # (Auto) 0.3 TH/MM3    


 


Basophils # (Auto) 0.1 TH/MM3    


 


CBC Comment DIFF FINAL    


 


Differential Comment     


 


Prothrombin Time 11.3 SEC    


 


Prothromb Time International


Ratio 1.0 RATIO 


  


  


  


 


 


Activated Partial


Thromboplast Time 28.7 SEC 


  


  29.1 SEC 


  


 


 


Blood Urea Nitrogen 13 MG/DL    


 


Creatinine 0.81 MG/DL    


 


Random Glucose 187 MG/DL    


 


Calcium Level 8.2 MG/DL    


 


Magnesium Level 1.9 MG/DL    


 


Sodium Level 137 MEQ/L    


 


Potassium Level 4.1 MEQ/L    


 


Chloride Level 103 MEQ/L    


 


Carbon Dioxide Level 24.8 MEQ/L    


 


Anion Gap 9 MEQ/L    


 


Estimat Glomerular Filtration


Rate 99 ML/MIN 


  


  


  


 


 


Total Creatine Kinase 143 U/L  103 U/L   87 U/L 


 


Creatine Kinase MB 2.7 NG/ML    


 


Troponin I 0.39 NG/ML  0.39 NG/ML   0.33 NG/ML 


 


B-Type Natriuretic Peptide 95 PG/ML    


 


Triglycerides Level  233 MG/DL   


 


Cholesterol Level  209 MG/DL   


 


LDL Cholesterol  111 MG/DL   


 


HDL Cholesterol  51.6 MG/DL   


 


Cholesterol/HDL Ratio  4.05 RATIO   


 


Hemoglobin A1c   8.8 %  


 


Test


  11/14/17


15:37 11/14/17


23:48 11/15/17


06:05 11/15/17


19:07


 


Activated Partial


Thromboplast Time 28.0 SEC 


  29.2 SEC 


  30.2 SEC 


  26.6 SEC 


 


 


White Blood Count   9.5 TH/MM3  


 


Red Blood Count   4.55 MIL/MM3  


 


Hemoglobin   15.1 GM/DL  


 


Hematocrit   44.1 %  


 


Mean Corpuscular Volume   96.9 FL  


 


Mean Corpuscular Hemoglobin   33.1 PG  


 


Mean Corpuscular Hemoglobin


Concent 


  


  34.1 % 


  


 


 


Red Cell Distribution Width   12.6 %  


 


Platelet Count   277 TH/MM3  


 


Mean Platelet Volume   7.3 FL  


 


Neutrophils (%) (Auto)   50.5 %  


 


Lymphocytes (%) (Auto)   36.1 %  


 


Monocytes (%) (Auto)   8.7 %  


 


Eosinophils (%) (Auto)   4.0 %  


 


Basophils (%) (Auto)   0.7 %  


 


Neutrophils # (Auto)   4.8 TH/MM3  


 


Lymphocytes # (Auto)   3.4 TH/MM3  


 


Monocytes # (Auto)   0.8 TH/MM3  


 


Eosinophils # (Auto)   0.4 TH/MM3  


 


Basophils # (Auto)   0.1 TH/MM3  


 


CBC Comment   DIFF FINAL  


 


Differential Comment     


 


Blood Urea Nitrogen   15 MG/DL  


 


Creatinine   0.96 MG/DL  


 


Random Glucose   193 MG/DL  


 


Calcium Level   8.3 MG/DL  


 


Sodium Level   136 MEQ/L  


 


Potassium Level   4.0 MEQ/L  


 


Chloride Level   102 MEQ/L  


 


Carbon Dioxide Level   28.9 MEQ/L  


 


Anion Gap   5 MEQ/L  


 


Estimat Glomerular Filtration


Rate 


  


  81 ML/MIN 


  


 


 


Test


  11/16/17


06:07 


  


  


 


 


White Blood Count 9.3 TH/MM3    


 


Red Blood Count 4.67 MIL/MM3    


 


Hemoglobin 15.7 GM/DL    


 


Hematocrit 45.0 %    


 


Mean Corpuscular Volume 96.2 FL    


 


Mean Corpuscular Hemoglobin 33.5 PG    


 


Mean Corpuscular Hemoglobin


Concent 34.8 % 


  


  


  


 


 


Red Cell Distribution Width 12.7 %    


 


Platelet Count 276 TH/MM3    


 


Mean Platelet Volume 7.7 FL    


 


Neutrophils (%) (Auto) 55.1 %    


 


Lymphocytes (%) (Auto) 31.7 %    


 


Monocytes (%) (Auto) 10.2 %    


 


Eosinophils (%) (Auto) 2.5 %    


 


Basophils (%) (Auto) 0.5 %    


 


Neutrophils # (Auto) 5.1 TH/MM3    


 


Lymphocytes # (Auto) 3.0 TH/MM3    


 


Monocytes # (Auto) 1.0 TH/MM3    


 


Eosinophils # (Auto) 0.2 TH/MM3    


 


Basophils # (Auto) 0.0 TH/MM3    


 


CBC Comment DIFF FINAL    


 


Differential Comment     


 


Blood Urea Nitrogen 13 MG/DL    


 


Creatinine 0.79 MG/DL    


 


Random Glucose 176 MG/DL    


 


Total Protein 6.7 GM/DL    


 


Albumin 2.9 GM/DL    


 


Calcium Level 8.4 MG/DL    


 


Phosphorus Level 3.4 MG/DL    


 


Magnesium Level 2.0 MG/DL    


 


Alkaline Phosphatase 85 U/L    


 


Aspartate Amino Transf


(AST/SGOT) 14 U/L 


  


  


  


 


 


Alanine Aminotransferase


(ALT/SGPT) 37 U/L 


  


  


  


 


 


Total Bilirubin 0.4 MG/DL    


 


Sodium Level 135 MEQ/L    


 


Potassium Level 3.8 MEQ/L    


 


Chloride Level 102 MEQ/L    


 


Carbon Dioxide Level 23.6 MEQ/L    


 


Anion Gap 9 MEQ/L    


 


Estimat Glomerular Filtration


Rate 101 ML/MIN 


  


  


  


 


 


Hemoglobin A1c 8.7 %    


 


Free Thyroxine 0.73 NG/DL    


 


Thyroid Stimulating Hormone


3rd Gen 3.320 uIU/ML 


  


  


  


 








Imaging





Last Impressions








Chest X-Ray 11/13/17 [id number] Signed





Impressions: 





 Service Date/Time:  Tuesday, November 14, 2017 00:10 - CONCLUSION:  Mild 





 pulmonary vascular congestion. Surgical clips overlie left chest. Some bony 





 hypertrophy of the right AC joint.     Jesse Segundo MD 








Objective Remarks


GENERAL: Awake alert oriented 3 --talkative and cooperative


SKIN: Warm and dry.


HEAD: Atraumatic. Normocephalic. 


EYES: Pupils equal and round. No scleral icterus. No injection or drainage.  

Extraocular muscles intact


ENT: No nasal bleeding or discharge.  Mucous membranes pink and moist.  Tongue 

is midline


NECK: Trachea midline. No JVD.  Neck is supple


CARDIOVASCULAR: Regular rate and rhythm.  S1-S2 no S3-S4 no heave or thrill or 

rub or gallop


RESPIRATORY: No accessory muscle use. Clear to auscultation. Breath sounds 

equal bilaterally. 


GASTROINTESTINAL: Abdomen soft, non-tender, nondistended. Hepatic and splenic 

margins not palpable.  Obese


MUSCULOSKELETAL: Extremities without clubbing, cyanosis, or edema. No obvious 

deformities. 


NEUROLOGICAL: Awake and alert. No obvious cranial nerve deficits.  Motor 

grossly within normal limits. Five out of 5 muscle strength in the arms and 

legs.  Normal speech.


PSYCHIATRIC: Appropriate mood and affect; insight and judgment normal.


Procedures


LYNDON DAUGHERTY M.D.


****


 


 


DATE: 11/15/2017


 


PROCEDURE


Left heart catheterization, selective coronary angiography, left


ventriculography.


 


PROCEDURE NOTE


The patient was brought to the cardiac catheterization laboratory in a fasting


state after having signed informed consent.  The right radial region was


prepped and draped as per policy and anesthetized with 1% lidocaine.  Arterial


access was obtained via the right radial artery and a 6-French sheath placed.


Coronary arteriography was performed using a Tiger catheter.  Additional shots


of the left system were taken with a Deya left 3.5.  Left ventriculography


was done using a standard 6-French pigtail.


There were no apparent immediate complications.  A radial artery compression


band was applied to his right wrist at the end of the case to achieve good


hemostasis.


 


HEMODYNAMIC DATA


Left ventricle 120 with an end-diastolic pressure of 18.  Aorta 113/79 with a


mean of 95.  There was no significant transvalvular aortic gradient on pullback


of the pigtail catheter.


 


CORONARY ARTERIOGRAPHY


The left main is normal.  The left anterior descending is diffusely diseased.


There is likely up to 30% stenosis in the distal third of the proximal LAD.


The mid-LAD has probably up to 40% stenosis diffusely right after the takeoff


of a fairly large branching diagonal.  This diagonals more medial branch, which


is smaller, has up to 40% proximal stenosis.  The mid to distal LAD otherwise


has mild diffuse disease up to 20% severity.  Left circumflex appears to be


totally occluded proximally.  There are fair to good right coronary to obtuse


marginal collaterals.  The right coronary artery is a very large dominant


vessel which is diffusely diseased.  There is likely up to 30% stenosis in the


proximal and midportions and up to 40% stenosis.


 


LEFT VENTRICULOGRAPHY


Contrast injection of the left ventricle reveals severe global hypokinesis.


Ejection fraction is estimated at 15-20%.


 


CONCLUSION


 


1. Severe single-vessel coronary artery disease, a totally occluded but


     collateralized left circumflex.


2. Severely reduced left ventricular systolic function with ejection fraction


     estimated at 15-20%.





NEEDS LIFE VEST AT DC


Medications and IVs





Current Medications


Aspirin (Aspirin Chew) 324 mg ONCE  ONCE CHEW  Last administered on 11/14/17at 

00:22;  Start 11/14/17 at 00:15;  Stop 11/14/17 at 00:17;  Status DC


Heparin Sodium (Porcine) (Heparin Inj) 4,000 units ONCE  ONCE IV  Last 

administered on 11/14/17at 01:45;  Start 11/14/17 at 01:45;  Stop 11/14/17 at 01

:46;  Status DC


Heparin Sodium/ Dextrose 250 ml @  10 mls/hr TITRATE  PRN IV Coagulation 

Management Last administered on 11/14/17at 02:12;  Start 11/14/17 at 01:45;  

Stop 11/14/17 at 07:52;  Status DC


Nitroglycerin (Nitroglycerin 2% Oint) 1 inch ONCE  ONCE TOPICAL  Last 

administered on 11/14/17at 02:32;  Start 11/14/17 at 02:00;  Stop 11/14/17 at 02

:01;  Status DC


Furosemide (Lasix Inj) 20 mg ONCE  ONCE IV PUSH  Last administered on 11/14/ 17at 02:32;  Start 11/14/17 at 02:15;  Stop 11/14/17 at 02:16;  Status DC


Sodium Chloride (NS Flush) 2 ml BID IV FLUSH  Last administered on 11/16/17at 08

:05;  Start 11/14/17 at 09:00


Sodium Chloride (NS Flush) 2 ml UNSCH  PRN IV FLUSH FLUSH AFTER USING IV ACCESS

;  Start 11/14/17 at 02:15


Aspirin (Aspirin) 325 mg DAILY PO  Last administered on 11/16/17at 08:01;  

Start 11/15/17 at 09:00


Nitroglycerin (Nitroglycerin 2% Oint) 1 inch Q6HR TOP ;  Start 11/14/17 at 08:00

;  Stop 11/15/17 at 15:39;  Status DC


Acetaminophen (Tylenol) 500 mg Q4H  PRN PO HEADACHE;  Start 11/14/17 at 02:15


Morphine Sulfate (Morphine Inj) 2 mg Q3HR  PRN IV PUSH PAIN SCALE 6 TO 10;  

Start 11/14/17 at 02:15


Dextrose (D50w (Vial) Inj) 50 ml UNSCH  PRN IV PUSH HYPOGLYCEMIA-SEE COMMENTS;  

Start 11/14/17 at 04:45


Glucagon (Glucagon Inj) 1 mg UNSCH  PRN OTHER HYPOGLYCEMIA-SEE COMMENTS;  Start 

11/14/17 at 04:45


Insulin Aspart (NovoLOG SUPPLEMENTAL SCALE) 1 ACHS SLIDING  SCALE SQ  Last 

administered on 11/16/17at 12:00;  Start 11/14/17 at 08:00


Heparin Sodium/ Dextrose 250 ml @  10 mls/hr TITRATE  PRN IV Coagulation 

Management Last administered on 11/15/17at 03:58;  Start 11/14/17 at 08:00


Carvedilol (Coreg) 3.125 mg Q12HR PO  Last administered on 11/16/17at 08:01;  

Start 11/14/17 at 21:00


Atorvastatin Calcium (Lipitor) 10 mg HS PO  Last administered on 11/15/17at 21:

46;  Start 11/14/17 at 21:00


Enalapril Maleate (Vasotec) 2.5 mg BID PO  Last administered on 11/16/17at 08:02

;  Start 11/14/17 at 21:00


Sodium Chloride 1,000 ml @  100 mls/hr Q10H IV  Last administered on 11/15/17at 

03:00;  Start 11/14/17 at 17:00;  Stop 11/19/17 at 16:59


Diphenhydramine HCl (Benadryl) 50 mg ON  CALL PO ;  Start 11/14/17 at 17:00;  

Stop 11/18/17 at 16:59


Diazepam (Valium) 10 mg ON  CALL PO ;  Start 11/14/17 at 17:00;  Stop 11/18/17 

at 16:59


Heparin Sodium/ Sodium Chloride 500 ml @  As Directed STK-MED ONCE .ROUTE  Last 

administered on 11/15/17at 14:47;  Start 11/15/17 at 14:47;  Stop 11/15/17 at 14

:48;  Status DC


Midazolam HCl (Versed Inj) 2 mg STK-MED ONCE .ROUTE  Last administered on 11/15/

17at 15:00;  Start 11/15/17 at 14:47;  Stop 11/15/17 at 14:48;  Status DC


Fentanyl Citrate (fentaNYL INJ) 100 mcg STK-MED ONCE .ROUTE ;  Start 11/15/17 

at 14:47;  Stop 11/15/17 at 14:48;  Status DC


Verapamil HCl (Isoptin Inj) 5 mg STK-MED ONCE .ROUTE  Last administered on 11/15

/17at 15:08;  Start 11/15/17 at 14:47;  Stop 11/15/17 at 14:48;  Status DC


Heparin Sodium (Porcine) (Heparin Inj) 10,000 units STK-MED ONCE .ROUTE  Last 

administered on 11/15/17at 15:08;  Start 11/15/17 at 14:48;  Stop 11/15/17 at 14

:49;  Status DC


Nitroglycerin 5 ml @ As Directed STK-MED ONCE .ROUTE  Last administered on 11/15

/17at 15:08;  Start 11/15/17 at 14:48;  Stop 11/15/17 at 14:49;  Status DC


Isosorbide Mononitrate (Imdur) 30 mg DAILY@07 PO  Last administered on 11/16/ 17at 06:25;  Start 11/16/17 at 07:00


Iohexol (OMNIPAQUE 350 INJ (Cath Lab)) 100 ml STK-MED ONCE OTHER ;  Start 11/15/

17 at 16:28;  Stop 11/15/17 at 16:29;  Status DC


Iohexol (OMNIPAQUE 350 INJ (Cath Lab)) 50 ml STK-MED ONCE OTHER ;  Start 11/15/

17 at 16:28;  Stop 11/15/17 at 16:29;  Status DC





A/P


Problem List:  


(1) Non-ST elevation myocardial infarction (NSTEMI)


ICD Code:  I21.4 - Non-ST elevation (NSTEMI) myocardial infarction


Status:  Acute


(2) Hypertension


ICD Code:  I10 - Essential (primary) hypertension


Status:  Acute


(3) Diabetes mellitus


ICD Code:  E11.9 - Type 2 diabetes mellitus without complications


Status:  Acute


(4) Tobacco use


ICD Code:  Z72.0 - Tobacco use


Status:  Acute


Assessment and Plan


Continue with ACS rule out including serial troponin/EKG 


Currently on Heparin drip, Nitropaste, beta blocker, statin, aspirin


Cardiology consulted for possible evaluation for left heart catheterization 

later today


Currently chest pain-free


CAD BUT NOT AMENABLE TO SURGERY


NEEDS MEDICAL THERAPY AND LIFE VEST FOR DC


EF 15-20%








2.  Shortness of breath


Likely secondary to NSTEMI


Chest x-ray significant for mild pulmonary vascular congestion


Status post IV Lasix 1


Supplemental oxygen


SMOKING CESSATION RECOMMENDED





3.  Diabetes mellitus


Not currently on any medication


A1c pending


Continue with SSI


WILL NEED MEDICATIONS AT DC





4.  Hypertension


Start Coreg twice a day





5. Hyperlipidemia





Start Lipitor at bedtime





Malignant noncompliance since patient has not seen a physician in many years 

even though he has insurance AND COULD NOT AFFORD TO TAKE HIS MEDICATIONS OR 

SEE A PHYSICIAN


Discharge Planning


[employer] 2 TO 3 WITH EF OF 15-20 NEEDS LIFEVEST AT DC





Problem Qualifiers





(1) Hypertension:  


Qualified Codes:  I10 - Essential (primary) hypertension








Donte Mclean DO Nov 16, 2017 15:00

## 2017-11-16 NOTE — PD.CARD.PN
Subjective


Subjective Remarks


Denies further CP.  No dyspnea, dizziness, palpitations, PND.





Objective


Medications











Item Value  Date Time


 


Isosorbide 30 mg 11/16/17 0700





Mononitrate DAILY@07/PO 11/16/17 0625





 (Imdur)  


 


Aspirin 325 mg 11/15/17 0900





 (Aspirin) DAILY/PO 11/16/17 0801


 


Carvedilol 3.125 mg 11/14/17 2100





 (Coreg) Q12HR/PO 11/16/17 0801


 


Atorvastatin 10 mg 11/14/17 2100





Calcium HS/PO 11/15/17 2146





 (Lipitor)  


 


Enalapril Maleate 2.5 mg 11/14/17 2100





 (Vasotec) BID/PO 11/16/17 0802











Current Medications








 Medications


  (Trade)  Dose


 Ordered  Sig/Maximo


 Route  Start Time


 Stop Time Status Last Admin


 


  (NS Flush)  2 ml  BID


 IV FLUSH  11/14/17 09:00


    11/16/17 08:05


 


 


  (NS Flush)  2 ml  UNSCH  PRN


 IV FLUSH  11/14/17 02:15


     


 


 


  (Aspirin)  325 mg  DAILY


 PO  11/15/17 09:00


    11/16/17 08:01


 


 


  (Tylenol)  500 mg  Q4H  PRN


 PO  11/14/17 02:15


     


 


 


  (Morphine Inj)  2 mg  Q3HR  PRN


 IV PUSH  11/14/17 02:15


     


 


 


  (D50w (Vial) Inj)  50 ml  UNSCH  PRN


 IV PUSH  11/14/17 04:45


     


 


 


  (Glucagon Inj)  1 mg  UNSCH  PRN


 OTHER  11/14/17 04:45


     


 


 


  (NovoLOG


 SUPPLEMENTAL


 SCALE)  1  ACHS SLIDING  SCALE


 SQ  11/14/17 08:00


    11/16/17 08:00


 


 


 Heparin Sodium/


 Dextrose  250 ml @ 


 10 mls/hr  TITRATE  PRN


 IV  11/14/17 08:00


    11/15/17 03:58


 


 


  (Coreg)  3.125 mg  Q12HR


 PO  11/14/17 21:00


    11/16/17 08:01


 


 


  (Lipitor)  10 mg  HS


 PO  11/14/17 21:00


    11/15/17 21:46


 


 


  (Vasotec)  2.5 mg  BID


 PO  11/14/17 21:00


    11/16/17 08:02


 


 


 Sodium Chloride  1,000 ml @ 


 100 mls/hr  Q10H


 IV  11/14/17 17:00


 11/19/17 16:59   


 


 


  (Benadryl)  50 mg  ON  CALL


 PO  11/14/17 17:00


 11/18/17 16:59   


 


 


  (Valium)  10 mg  ON  CALL


 PO  11/14/17 17:00


 11/18/17 16:59   


 


 


  (Imdur)  30 mg  DAILY@07


 PO  11/16/17 07:00


    11/16/17 06:25


 








Vital Signs / I&O





Vital Signs








  Date Time  Temp Pulse Resp B/P (MAP) Pulse Ox O2 Delivery O2 Flow Rate FiO2


 


11/16/17 07:00 98.3 68 18 126/79 (95) 95   


 


11/16/17 07:00  68      


 


11/16/17 06:00  68      


 


11/16/17 05:00  68      


 


11/16/17 04:00  68      


 


11/16/17 03:00  70      


 


11/16/17 03:00 98.2 69 16 122/82 (95) 97   


 


11/16/17 02:00  63      


 


11/16/17 01:00  68      


 


11/16/17 00:00  68      


 


11/15/17 23:00  72      


 


11/15/17 23:00 98.7 71 20 137/98 (111) 95   


 


11/15/17 22:00  92      


 


11/15/17 21:00  72      


 


11/15/17 20:00  76      


 


11/15/17 19:00  70      


 


11/15/17 19:00 98.9 79 20 116/72 (87) 94   


 


11/15/17 18:00  76      


 


11/15/17 17:00  74      


 


11/15/17 16:00  88      


 


11/15/17 15:47  74      


 


11/15/17 15:00 98.3 70 20 120/88 (99) 100   


 


11/15/17 15:00  88      


 


11/15/17 13:00  64      


 


11/15/17 12:00  63      


 


11/15/17 11:00 97.9 75 20 127/79 (95) 94   


 


11/15/17 11:00  68      


 


11/15/17 10:00  70      


 


11/15/17 09:00  68      














I/O      


 


 11/15/17 11/15/17 11/15/17 11/16/17 11/16/17 11/16/17





 07:00 15:00 23:00 07:00 15:00 23:00


 


Intake Total 370 ml  1010 ml 600 ml  


 


Output Total 1625 ml  1000 ml 550 ml  


 


Balance -1255 ml  10 ml 50 ml  


 


      


 


Intake Oral 240 ml  480 ml 600 ml  


 


IV Total 130 ml  530 ml   


 


Output Urine Total 1625 ml  1000 ml 550 ml  


 


# Bowel Movements 1   1  








Physical Exam


GENERAL: Well developed, well nourished. No acute distress.


HEENT: Jugular venous pressure very hard to assess.


CHEST: Lungs clear to auscultation bilaterally. Unlabored respiratory effort.


CARDIAC: Regular rate and rhythm without S3, S4, or murmur.


ABDOMEN: Soft, nontender, no hepatosplenomegaly. Bowel sounds present.


EXTREMITIES: No clubbing, cyanosis, or edema.


Laboratory





Laboratory Tests








Test


  11/15/17


19:07 11/16/17


06:07


 


Activated Partial


Thromboplast Time 26.6 SEC 


  


 


 


White Blood Count  9.3 TH/MM3 


 


Red Blood Count  4.67 MIL/MM3 


 


Hemoglobin  15.7 GM/DL 


 


Hematocrit  45.0 % 


 


Mean Corpuscular Volume  96.2 FL 


 


Mean Corpuscular Hemoglobin  33.5 PG 


 


Mean Corpuscular Hemoglobin


Concent 


  34.8 % 


 


 


Red Cell Distribution Width  12.7 % 


 


Platelet Count  276 TH/MM3 


 


Mean Platelet Volume  7.7 FL 


 


Neutrophils (%) (Auto)  55.1 % 


 


Lymphocytes (%) (Auto)  31.7 % 


 


Monocytes (%) (Auto)  10.2 % 


 


Eosinophils (%) (Auto)  2.5 % 


 


Basophils (%) (Auto)  0.5 % 


 


Neutrophils # (Auto)  5.1 TH/MM3 


 


Lymphocytes # (Auto)  3.0 TH/MM3 


 


Monocytes # (Auto)  1.0 TH/MM3 


 


Eosinophils # (Auto)  0.2 TH/MM3 


 


Basophils # (Auto)  0.0 TH/MM3 


 


CBC Comment  DIFF FINAL 


 


Differential Comment   


 


Blood Urea Nitrogen  13 MG/DL 


 


Creatinine  0.79 MG/DL 


 


Random Glucose  176 MG/DL 


 


Total Protein  6.7 GM/DL 


 


Albumin  2.9 GM/DL 


 


Calcium Level  8.4 MG/DL 


 


Phosphorus Level  3.4 MG/DL 


 


Magnesium Level  2.0 MG/DL 


 


Alkaline Phosphatase  85 U/L 


 


Aspartate Amino Transf


(AST/SGOT) 


  14 U/L 


 


 


Alanine Aminotransferase


(ALT/SGPT) 


  37 U/L 


 


 


Total Bilirubin  0.4 MG/DL 


 


Sodium Level  135 MEQ/L 


 


Potassium Level  3.8 MEQ/L 


 


Chloride Level  102 MEQ/L 


 


Carbon Dioxide Level  23.6 MEQ/L 


 


Anion Gap  9 MEQ/L 


 


Estimat Glomerular Filtration


Rate 


  101 ML/MIN 


 


 


Free Thyroxine  0.73 NG/DL 


 


Thyroid Stimulating Hormone


3rd Gen 


  3.320 uIU/ML 


 











Assessment and Plan


Problem List:  


(1) CAD (coronary artery disease)


ICD Codes:  I25.10 - Atherosclerotic heart disease of native coronary artery 

without angina pectoris


Plan:  Stable overnight.  Cath shows likely chronic total occlusion of proximal 

left circumflex with fair to good RCA to circumflex collaterals, mild to 

moderate non-obstructive disease in LAD and RCA.





REC medical therapy, continue beta blocker, ACE-I, aspirin, isosorbide


         OK to discharge today after he receives LifeVest





(2) Dilated cardiomyopathy


ICD Codes:  I42.0 - Dilated cardiomyopathy


Status:  Chronic


Plan:  Possible mild CHF on admission.  EF 15-20% by cath.  Suspect 

cardiomyopathy is predominantly non-ischemic in origin.  Patient compensated at 

present.





REC continue carvedilol, enalapril


         echo in 90 days to reassess EF, consider ICD implant if EF still < 35%


         await insurance approval for LifeVest today





(3) Hyperlipidemia


ICD Codes:  E78.5 - Hyperlipidemia, unspecified


Plan:  Suboptimal lipid profile with .  Continue statin.





(4) Hypertension


ICD Codes:  I10 - Essential (primary) hypertension


Status:  Acute


Plan:  Stable.  Normotensive.





Code Status


full code


Discussed Condition With


patient, at length





Problem Qualifiers





(1) CAD (coronary artery disease):  


Qualified Codes:  I25.110 - Atherosclerotic heart disease of native coronary 

artery with unstable angina pectoris


(2) Hyperlipidemia:  


Qualified Codes:  E78.2 - Mixed hyperlipidemia


(3) Hypertension:  


Qualified Codes:  I10 - Essential (primary) hypertension








Kamari Bonds MD Nov 16, 2017 08:25

## 2017-11-17 VITALS — HEART RATE: 70 BPM

## 2017-11-17 VITALS
SYSTOLIC BLOOD PRESSURE: 139 MMHG | HEART RATE: 66 BPM | OXYGEN SATURATION: 95 % | RESPIRATION RATE: 18 BRPM | DIASTOLIC BLOOD PRESSURE: 80 MMHG | TEMPERATURE: 98.4 F

## 2017-11-17 VITALS
SYSTOLIC BLOOD PRESSURE: 118 MMHG | RESPIRATION RATE: 18 BRPM | DIASTOLIC BLOOD PRESSURE: 64 MMHG | TEMPERATURE: 97.5 F | HEART RATE: 65 BPM | OXYGEN SATURATION: 95 %

## 2017-11-17 VITALS — HEART RATE: 64 BPM

## 2017-11-17 VITALS — HEART RATE: 65 BPM

## 2017-11-17 VITALS — HEART RATE: 72 BPM

## 2017-11-17 VITALS
RESPIRATION RATE: 18 BRPM | SYSTOLIC BLOOD PRESSURE: 140 MMHG | DIASTOLIC BLOOD PRESSURE: 77 MMHG | HEART RATE: 77 BPM | OXYGEN SATURATION: 94 % | TEMPERATURE: 98.5 F

## 2017-11-17 VITALS — HEART RATE: 68 BPM

## 2017-11-17 VITALS — HEART RATE: 62 BPM

## 2017-11-17 LAB
ALP SERPL-CCNC: 88 U/L (ref 45–117)
ALT SERPL-CCNC: 35 U/L (ref 12–78)
ANION GAP SERPL CALC-SCNC: 6 MEQ/L (ref 5–15)
AST SERPL-CCNC: 16 U/L (ref 15–37)
BASOPHILS # BLD AUTO: 0.1 TH/MM3 (ref 0–0.2)
BASOPHILS NFR BLD: 0.6 % (ref 0–2)
BILIRUB SERPL-MCNC: 0.5 MG/DL (ref 0.2–1)
BUN SERPL-MCNC: 11 MG/DL (ref 7–18)
CHLORIDE SERPL-SCNC: 102 MEQ/L (ref 98–107)
EOSINOPHIL # BLD: 0.3 TH/MM3 (ref 0–0.4)
EOSINOPHIL NFR BLD: 3.5 % (ref 0–4)
ERYTHROCYTE [DISTWIDTH] IN BLOOD BY AUTOMATED COUNT: 12.8 % (ref 11.6–17.2)
GFR SERPLBLD BASED ON 1.73 SQ M-ARVRAT: 81 ML/MIN (ref 89–?)
HCO3 BLD-SCNC: 29.6 MEQ/L (ref 21–32)
HCT VFR BLD CALC: 45.2 % (ref 39–51)
HEMO FLAGS: (no result)
LYMPHOCYTES # BLD AUTO: 3.5 TH/MM3 (ref 1–4.8)
LYMPHOCYTES NFR BLD AUTO: 39.4 % (ref 9–44)
MAGNESIUM SERPL-MCNC: 2 MG/DL (ref 1.5–2.5)
MCH RBC QN AUTO: 33.5 PG (ref 27–34)
MCHC RBC AUTO-ENTMCNC: 34.9 % (ref 32–36)
MCV RBC AUTO: 95.8 FL (ref 80–100)
MONOCYTES NFR BLD: 9.1 % (ref 0–8)
NEUTROPHILS # BLD AUTO: 4.2 TH/MM3 (ref 1.8–7.7)
NEUTROPHILS NFR BLD AUTO: 47.4 % (ref 16–70)
PLATELET # BLD: 280 TH/MM3 (ref 150–450)
POTASSIUM SERPL-SCNC: 4.1 MEQ/L (ref 3.5–5.1)
RBC # BLD AUTO: 4.72 MIL/MM3 (ref 4.5–5.9)
SODIUM SERPL-SCNC: 138 MEQ/L (ref 136–145)
WBC # BLD AUTO: 8.8 TH/MM3 (ref 4–11)

## 2017-11-17 RX ADMIN — PHENYTOIN SODIUM SCH MLS/HR: 50 INJECTION INTRAMUSCULAR; INTRAVENOUS at 01:43

## 2017-11-17 RX ADMIN — CARVEDILOL SCH MG: 3.12 TABLET, FILM COATED ORAL at 08:15

## 2017-11-17 RX ADMIN — INSULIN ASPART SCH: 100 INJECTION, SOLUTION INTRAVENOUS; SUBCUTANEOUS at 08:00

## 2017-11-17 RX ADMIN — INSULIN ASPART SCH: 100 INJECTION, SOLUTION INTRAVENOUS; SUBCUTANEOUS at 12:00

## 2017-11-17 RX ADMIN — ASPIRIN SCH MG: 325 TABLET ORAL at 08:16

## 2017-11-17 RX ADMIN — ISOSORBIDE MONONITRATE SCH MG: 30 TABLET, EXTENDED RELEASE ORAL at 06:19

## 2017-11-17 RX ADMIN — Medication SCH ML: at 08:16

## 2017-11-17 RX ADMIN — ENALAPRIL MALEATE SCH MG: 2.5 TABLET ORAL at 08:16

## 2017-11-17 NOTE — HHI.PR
Subjective


Remarks


57-year-old male with a past medical history significant for hypertension and 

type 2 diabetes mellitus who presents with increasing shortness of breath and 

chest tightness 1 day.  The patient reports that he was having shortness of 

breath and chest pressure when he woke up this morning.  His shortness of 

breath was relieved with lying down however when he woke up to take a shower to 

go to work, the pain and shortness of breath returned.  He was previously 

stented in 2011 in [place], however has not been on any medications in quite 

some time.  He carries a diagnosis of hypertension and diabetes however does 

not take any medications because he cannot afford these.  He last saw PCP 

approximately 3 years ago.  Troponin on admission was 0.39.  EKG with no ST 

segment elevations or depressions.  Chest x-ray significant for pulmonary 

vascular congestion.





11/14/17-patient seen and examined and currently he is free of chest pain








11-15 NO CHEST PAIN


DW RN AND PT


TO HAVE CARDIAC CATH LATER TODAY


AM LABS





11-16  CATH SHOWED LOW EF 15-20 WILL NEED LIFE VEST BEFORE DISCHARGE


MILLIE LAURENT AND PATIENT AND RN


NEEDS TO STOP SMOKING


WILL NEED FOLLOWUPS AFTER DC


WORKS AT [employer]


DENIES CHEST PAIN OR PALPITATIONS AT THIS TIME





11-17 POSSIBLE DC TO HOME TODAY IF CAN GET LIFE VEST


WILL CLEAR IF [facility] CLEARS HIM TO HAVE THE VEST


DW RN AND PATIENT


BLOOD SUGARS BETTER TODAY





Objective


Vitals





Vital Signs








  Date Time  Temp Pulse Resp B/P (MAP) Pulse Ox O2 Delivery O2 Flow Rate FiO2


 


11/17/17 09:01  70      


 


11/17/17 08:30 98.4 66 18 139/80 (99) 95   


 


11/17/17 08:00  62      


 


11/17/17 07:01  64      


 


11/17/17 06:00  65      


 


11/17/17 05:00  65      


 


11/17/17 04:00  65      


 


11/17/17 03:00  57      


 


11/17/17 03:00 97.5 65 18 118/64 (82) 95   


 


11/17/17 02:00  68      


 


11/17/17 01:00  68      


 


11/17/17 00:00  68      


 


11/16/17 23:00 97.6 69 18 144/86 (105) 98   


 


11/16/17 23:00  80      


 


11/16/17 22:00  68      


 


11/16/17 21:00  69      


 


11/16/17 20:00  66      


 


11/16/17 19:24 98.4 73 18 137/78 (97) 95   


 


11/16/17 19:00  69      


 


11/16/17 18:00  66      


 


11/16/17 17:00  69      


 


11/16/17 16:00  70      


 


11/16/17 15:00  72      


 


11/16/17 15:00 98.9 70 20 122/76 (91) 95   


 


11/16/17 14:00  69      


 


11/16/17 13:00  68      


 


11/16/17 12:00  73      


 


11/16/17 11:00  71      


 


11/16/17 11:00 98.4 71 18 124/75 (91) 96   


 


11/16/17 10:00  70      














I/O      


 


 11/16/17 11/16/17 11/16/17 11/17/17 11/17/17 11/17/17





 07:00 15:00 23:00 07:00 15:00 23:00


 


Intake Total 600 ml 500 ml 525 ml 400 ml  


 


Output Total 550 ml  450 ml   


 


Balance 50 ml 500 ml 75 ml 400 ml  


 


      


 


Intake Oral 600 ml  525 ml 400 ml  


 


IV Total  500 ml 0 ml   


 


Output Urine Total 550 ml  450 ml   


 


# Voids    2  


 


# Bowel Movements 1  0   








Result Diagram:  


11/17/17 0611                                                                  

              11/17/17 0611





Other Results





 Laboratory Tests








Test


  11/14/17


13:30 11/14/17


15:37 11/14/17


23:48 11/15/17


06:05


 


Total Creatine Kinase 87 U/L    


 


Troponin I 0.33 NG/ML    


 


Activated Partial


Thromboplast Time 


  28.0 SEC 


  29.2 SEC 


  30.2 SEC 


 


 


White Blood Count    9.5 TH/MM3 


 


Red Blood Count    4.55 MIL/MM3 


 


Hemoglobin    15.1 GM/DL 


 


Hematocrit    44.1 % 


 


Mean Corpuscular Volume    96.9 FL 


 


Mean Corpuscular Hemoglobin    33.1 PG 


 


Mean Corpuscular Hemoglobin


Concent 


  


  


  34.1 % 


 


 


Red Cell Distribution Width    12.6 % 


 


Platelet Count    277 TH/MM3 


 


Mean Platelet Volume    7.3 FL 


 


Neutrophils (%) (Auto)    50.5 % 


 


Lymphocytes (%) (Auto)    36.1 % 


 


Monocytes (%) (Auto)    8.7 % 


 


Eosinophils (%) (Auto)    4.0 % 


 


Basophils (%) (Auto)    0.7 % 


 


Neutrophils # (Auto)    4.8 TH/MM3 


 


Lymphocytes # (Auto)    3.4 TH/MM3 


 


Monocytes # (Auto)    0.8 TH/MM3 


 


Eosinophils # (Auto)    0.4 TH/MM3 


 


Basophils # (Auto)    0.1 TH/MM3 


 


CBC Comment    DIFF FINAL 


 


Differential Comment     


 


Blood Urea Nitrogen    15 MG/DL 


 


Creatinine    0.96 MG/DL 


 


Random Glucose    193 MG/DL 


 


Calcium Level    8.3 MG/DL 


 


Sodium Level    136 MEQ/L 


 


Potassium Level    4.0 MEQ/L 


 


Chloride Level    102 MEQ/L 


 


Carbon Dioxide Level    28.9 MEQ/L 


 


Anion Gap    5 MEQ/L 


 


Estimat Glomerular Filtration


Rate 


  


  


  81 ML/MIN 


 


 


Test


  11/15/17


19:07 11/16/17


06:07 11/17/17


06:11 


 


 


Activated Partial


Thromboplast Time 26.6 SEC 


  


  


  


 


 


White Blood Count  9.3 TH/MM3  8.8 TH/MM3  


 


Red Blood Count  4.67 MIL/MM3  4.72 MIL/MM3  


 


Hemoglobin  15.7 GM/DL  15.8 GM/DL  


 


Hematocrit  45.0 %  45.2 %  


 


Mean Corpuscular Volume  96.2 FL  95.8 FL  


 


Mean Corpuscular Hemoglobin  33.5 PG  33.5 PG  


 


Mean Corpuscular Hemoglobin


Concent 


  34.8 % 


  34.9 % 


  


 


 


Red Cell Distribution Width  12.7 %  12.8 %  


 


Platelet Count  276 TH/MM3  280 TH/MM3  


 


Mean Platelet Volume  7.7 FL  7.5 FL  


 


Neutrophils (%) (Auto)  55.1 %  47.4 %  


 


Lymphocytes (%) (Auto)  31.7 %  39.4 %  


 


Monocytes (%) (Auto)  10.2 %  9.1 %  


 


Eosinophils (%) (Auto)  2.5 %  3.5 %  


 


Basophils (%) (Auto)  0.5 %  0.6 %  


 


Neutrophils # (Auto)  5.1 TH/MM3  4.2 TH/MM3  


 


Lymphocytes # (Auto)  3.0 TH/MM3  3.5 TH/MM3  


 


Monocytes # (Auto)  1.0 TH/MM3  0.8 TH/MM3  


 


Eosinophils # (Auto)  0.2 TH/MM3  0.3 TH/MM3  


 


Basophils # (Auto)  0.0 TH/MM3  0.1 TH/MM3  


 


CBC Comment  DIFF FINAL  DIFF FINAL  


 


Differential Comment      


 


Blood Urea Nitrogen  13 MG/DL  11 MG/DL  


 


Creatinine  0.79 MG/DL  0.96 MG/DL  


 


Random Glucose  176 MG/DL  156 MG/DL  


 


Total Protein  6.7 GM/DL  6.6 GM/DL  


 


Albumin  2.9 GM/DL  3.0 GM/DL  


 


Calcium Level  8.4 MG/DL  8.6 MG/DL  


 


Phosphorus Level  3.4 MG/DL  3.9 MG/DL  


 


Magnesium Level  2.0 MG/DL  2.0 MG/DL  


 


Alkaline Phosphatase  85 U/L  88 U/L  


 


Aspartate Amino Transf


(AST/SGOT) 


  14 U/L 


  16 U/L 


  


 


 


Alanine Aminotransferase


(ALT/SGPT) 


  37 U/L 


  35 U/L 


  


 


 


Total Bilirubin  0.4 MG/DL  0.5 MG/DL  


 


Sodium Level  135 MEQ/L  138 MEQ/L  


 


Potassium Level  3.8 MEQ/L  4.1 MEQ/L  


 


Chloride Level  102 MEQ/L  102 MEQ/L  


 


Carbon Dioxide Level  23.6 MEQ/L  29.6 MEQ/L  


 


Anion Gap  9 MEQ/L  6 MEQ/L  


 


Estimat Glomerular Filtration


Rate 


  101 ML/MIN 


  81 ML/MIN 


  


 


 


Hemoglobin A1c  8.7 %   


 


Free Thyroxine  0.73 NG/DL   


 


Thyroid Stimulating Hormone


3rd Gen 


  3.320 uIU/ML 


  


  


 








Imaging





Last Impressions








Chest X-Ray 11/13/17 2353 Signed





Impressions: 





 Service Date/Time:  Tuesday, November 14, 2017 00:10 - CONCLUSION:  Mild 





 pulmonary vascular congestion. Surgical clips overlie left chest. Some bony 





 hypertrophy of the right AC joint.     Jesse Segundo MD 








Objective Remarks


GENERAL: Awake alert oriented 3 --talkative and cooperative


SKIN: Warm and dry.


HEAD: Atraumatic. Normocephalic. 


EYES: Pupils equal and round. No scleral icterus. No injection or drainage.  

Extraocular muscles intact


ENT: No nasal bleeding or discharge.  Mucous membranes pink and moist.  Tongue 

is midline


NECK: Trachea midline. No JVD.  Neck is supple


CARDIOVASCULAR: Regular rate and rhythm.  S1-S2 no S3-S4 no heave or thrill or 

rub or gallop


RESPIRATORY: No accessory muscle use. Clear to auscultation. Breath sounds 

equal bilaterally. 


GASTROINTESTINAL: Abdomen soft, non-tender, nondistended. Hepatic and splenic 

margins not palpable.  Obese


MUSCULOSKELETAL: Extremities without clubbing, cyanosis, or edema. No obvious 

deformities. 


NEUROLOGICAL: Awake and alert. No obvious cranial nerve deficits.  Motor 

grossly within normal limits. Five out of 5 muscle strength in the arms and 

legs.  Normal speech.


PSYCHIATRIC: Appropriate mood and affect; insight and judgment normal.


Procedures


LYNDON DAUGHERTY M.D.


****


 


 


DATE: 11/15/2017


 


PROCEDURE


Left heart catheterization, selective coronary angiography, left


ventriculography.


 


PROCEDURE NOTE


The patient was brought to the cardiac catheterization laboratory in a fasting


state after having signed informed consent.  The right radial region was


prepped and draped as per policy and anesthetized with 1% lidocaine.  Arterial


access was obtained via the right radial artery and a 6-French sheath placed.


Coronary arteriography was performed using a Tiger catheter.  Additional shots


of the left system were taken with a Deya left 3.5.  Left ventriculography


was done using a standard 6-French pigtail.


There were no apparent immediate complications.  A radial artery compression


band was applied to his right wrist at the end of the case to achieve good


hemostasis.


 


HEMODYNAMIC DATA


Left ventricle 120 with an end-diastolic pressure of 18.  Aorta 113/79 with a


mean of 95.  There was no significant transvalvular aortic gradient on pullback


of the pigtail catheter.


 


CORONARY ARTERIOGRAPHY


The left main is normal.  The left anterior descending is diffusely diseased.


There is likely up to 30% stenosis in the distal third of the proximal LAD.


The mid-LAD has probably up to 40% stenosis diffusely right after the takeoff


of a fairly large branching diagonal.  This diagonals more medial branch, which


is smaller, has up to 40% proximal stenosis.  The mid to distal LAD otherwise


has mild diffuse disease up to 20% severity.  Left circumflex appears to be


totally occluded proximally.  There are fair to good right coronary to obtuse


marginal collaterals.  The right coronary artery is a very large dominant


vessel which is diffusely diseased.  There is likely up to 30% stenosis in the


proximal and midportions and up to 40% stenosis.


 


LEFT VENTRICULOGRAPHY


Contrast injection of the left ventricle reveals severe global hypokinesis.


Ejection fraction is estimated at 15-20%.


 


CONCLUSION


 


1. Severe single-vessel coronary artery disease, a totally occluded but


     collateralized left circumflex.


2. Severely reduced left ventricular systolic function with ejection fraction


     estimated at 15-20%.





NEEDS LIFE VEST AT DC


Medications and IVs





Current Medications


Aspirin (Aspirin Chew) 324 mg ONCE  ONCE CHEW  Last administered on 11/14/17at 

00:22;  Start 11/14/17 at 00:15;  Stop 11/14/17 at 00:17;  Status DC


Heparin Sodium (Porcine) (Heparin Inj) 4,000 units ONCE  ONCE IV  Last 

administered on 11/14/17at 01:45;  Start 11/14/17 at 01:45;  Stop 11/14/17 at 01

:46;  Status DC


Heparin Sodium/ Dextrose 250 ml @  10 mls/hr TITRATE  PRN IV Coagulation 

Management Last administered on 11/14/17at 02:12;  Start 11/14/17 at 01:45;  

Stop 11/14/17 at 07:52;  Status DC


Nitroglycerin (Nitroglycerin 2% Oint) 1 inch ONCE  ONCE TOPICAL  Last 

administered on 11/14/17at 02:32;  Start 11/14/17 at 02:00;  Stop 11/14/17 at 02

:01;  Status DC


Furosemide (Lasix Inj) 20 mg ONCE  ONCE IV PUSH  Last administered on 11/14/ 17at 02:32;  Start 11/14/17 at 02:15;  Stop 11/14/17 at 02:16;  Status DC


Sodium Chloride (NS Flush) 2 ml BID IV FLUSH  Last administered on 11/17/17at 08

:16;  Start 11/14/17 at 09:00


Sodium Chloride (NS Flush) 2 ml UNSCH  PRN IV FLUSH FLUSH AFTER USING IV ACCESS

;  Start 11/14/17 at 02:15


Aspirin (Aspirin) 325 mg DAILY PO  Last administered on 11/17/17at 08:16;  

Start 11/15/17 at 09:00


Nitroglycerin (Nitroglycerin 2% Oint) 1 inch Q6HR TOP ;  Start 11/14/17 at 08:00

;  Stop 11/15/17 at 15:39;  Status DC


Acetaminophen (Tylenol) 500 mg Q4H  PRN PO HEADACHE;  Start 11/14/17 at 02:15


Morphine Sulfate (Morphine Inj) 2 mg Q3HR  PRN IV PUSH PAIN SCALE 6 TO 10;  

Start 11/14/17 at 02:15


Dextrose (D50w (Vial) Inj) 50 ml UNSCH  PRN IV PUSH HYPOGLYCEMIA-SEE COMMENTS;  

Start 11/14/17 at 04:45


Glucagon (Glucagon Inj) 1 mg UNSCH  PRN OTHER HYPOGLYCEMIA-SEE COMMENTS;  Start 

11/14/17 at 04:45


Insulin Aspart (NovoLOG SUPPLEMENTAL SCALE) 1 ACHS SLIDING  SCALE SQ  Last 

administered on 11/17/17at 08:00;  Start 11/14/17 at 08:00


Heparin Sodium/ Dextrose 250 ml @  10 mls/hr TITRATE  PRN IV Coagulation 

Management Last administered on 11/15/17at 03:58;  Start 11/14/17 at 08:00


Carvedilol (Coreg) 3.125 mg Q12HR PO  Last administered on 11/17/17at 08:15;  

Start 11/14/17 at 21:00


Atorvastatin Calcium (Lipitor) 10 mg HS PO  Last administered on 11/16/17at 21:

26;  Start 11/14/17 at 21:00


Enalapril Maleate (Vasotec) 2.5 mg BID PO  Last administered on 11/17/17at 08:16

;  Start 11/14/17 at 21:00


Sodium Chloride 1,000 ml @  100 mls/hr Q10H IV  Last administered on 11/15/17at 

03:00;  Start 11/14/17 at 17:00;  Stop 11/19/17 at 16:59


Diphenhydramine HCl (Benadryl) 50 mg ON  CALL PO ;  Start 11/14/17 at 17:00;  

Stop 11/18/17 at 16:59


Diazepam (Valium) 10 mg ON  CALL PO ;  Start 11/14/17 at 17:00;  Stop 11/18/17 

at 16:59


Heparin Sodium/ Sodium Chloride 500 ml @  As Directed STK-MED ONCE .ROUTE  Last 

administered on 11/15/17at 14:47;  Start 11/15/17 at 14:47;  Stop 11/15/17 at 14

:48;  Status DC


Midazolam HCl (Versed Inj) 2 mg STK-MED ONCE .ROUTE  Last administered on 11/15/

17at 15:00;  Start 11/15/17 at 14:47;  Stop 11/15/17 at 14:48;  Status DC


Fentanyl Citrate (fentaNYL INJ) 100 mcg STK-MED ONCE .ROUTE ;  Start 11/15/17 

at 14:47;  Stop 11/15/17 at 14:48;  Status DC


Verapamil HCl (Isoptin Inj) 5 mg STK-MED ONCE .ROUTE  Last administered on 11/15

/17at 15:08;  Start 11/15/17 at 14:47;  Stop 11/15/17 at 14:48;  Status DC


Heparin Sodium (Porcine) (Heparin Inj) 10,000 units STK-MED ONCE .ROUTE  Last 

administered on 11/15/17at 15:08;  Start 11/15/17 at 14:48;  Stop 11/15/17 at 14

:49;  Status DC


Nitroglycerin 5 ml @ As Directed STK-MED ONCE .ROUTE  Last administered on 11/15

/17at 15:08;  Start 11/15/17 at 14:48;  Stop 11/15/17 at 14:49;  Status DC


Isosorbide Mononitrate (Imdur) 30 mg DAILY@07 PO  Last administered on 11/17/ 17at 06:19;  Start 11/16/17 at 07:00


Iohexol (OMNIPAQUE 350 INJ (Cath Lab)) 100 ml STK-MED ONCE OTHER ;  Start 11/15/

17 at 16:28;  Stop 11/15/17 at 16:29;  Status DC


Iohexol (OMNIPAQUE 350 INJ (Cath Lab)) 50 ml STK-MED ONCE OTHER ;  Start 11/15/

17 at 16:28;  Stop 11/15/17 at 16:29;  Status DC


Metformin HCl (Glucophage) 500 mg BIDPC PO  Last administered on 11/17/17at 08:

16;  Start 11/16/17 at 18:00


Urinary Catheter:  No


Vascular Central Line Catheter:  No





A/P


Problem List:  


(1) Non-ST elevation myocardial infarction (NSTEMI)


ICD Code:  I21.4 - Non-ST elevation (NSTEMI) myocardial infarction


Status:  Acute


(2) Hypertension


ICD Code:  I10 - Essential (primary) hypertension


Status:  Acute


(3) Diabetes mellitus


ICD Code:  E11.9 - Type 2 diabetes mellitus without complications


Status:  Acute


(4) Tobacco use


ICD Code:  Z72.0 - Tobacco use


Status:  Acute


Assessment and Plan


Continue with ACS rule out including serial troponin/EKG 


Currently on Heparin drip, Nitropaste, beta blocker, statin, aspirin


Cardiology consulted for possible evaluation for left heart catheterization 

later today


Currently chest pain-free


CAD BUT NOT AMENABLE TO SURGERY


NEEDS MEDICAL THERAPY AND LIFE VEST FOR DC


EF 15-20%








2.  Shortness of breath


Likely secondary to NSTEMI


Chest x-ray significant for mild pulmonary vascular congestion


Status post IV Lasix 1


Supplemental oxygen


SMOKING CESSATION RECOMMENDED





3.  Diabetes mellitus


Not currently on any medication


A1c pending


Continue with SSI


WILL NEED MEDICATIONS AT DC





4.  Hypertension


Start Coreg twice a day





5. Hyperlipidemia





Start Lipitor at bedtime





Malignant noncompliance since patient has not seen a physician in many years 

even though he has insurance AND COULD NOT AFFORD TO TAKE HIS MEDICATIONS OR 

SEE A PHYSICIAN





DC TO HOME TODAY   11-17 IF LIFE VEST IS AVAILABLE


Discharge Planning


[facility] 2 TO 3 WITH EF OF 15-20 NEEDS LIFEVEST AT DC





Problem Qualifiers





(1) Hypertension:  


Qualified Codes:  I10 - Essential (primary) hypertension








Donte Mclean DO Nov 17, 2017 09:49

## 2017-11-17 NOTE — HHI.DS
__________________________________________________





Discharge Summary


Admission Date


Nov 14, 2017 at 01:53


Discharge Date:  Nov 17, 2017


Admitting Diagnosis





NSTEMI, htn, diabetes mellitus, tobaccoism.





(1) Non-ST elevation myocardial infarction (NSTEMI)


ICD Code:  I21.4 - Non-ST elevation (NSTEMI) myocardial infarction


Diagnosis:  Principal


Status:  Acute


(2) Hypertension


ICD Code:  I10 - Essential (primary) hypertension


Diagnosis:  Secondary


Status:  Acute


(3) Diabetes mellitus


ICD Code:  E11.9 - Type 2 diabetes mellitus without complications


Diagnosis:  Principal


Status:  Acute


(4) Tobacco use


ICD Code:  Z72.0 - Tobacco use


Diagnosis:  Principal


Status:  Acute


Procedures


LYNDON BONDS M.D.


****


 


 


DATE: 11/15/2017


 


PROCEDURE


Left heart catheterization, selective coronary angiography, left


ventriculography.


 


PROCEDURE NOTE


The patient was brought to the cardiac catheterization laboratory in a fasting


state after having signed informed consent.  The right radial region was


prepped and draped as per policy and anesthetized with 1% lidocaine.  Arterial


access was obtained via the right radial artery and a 6-French sheath placed.


Coronary arteriography was performed using a Tiger catheter.  Additional shots


of the left system were taken with a Deya left 3.5.  Left ventriculography


was done using a standard 6-French pigtail.


There were no apparent immediate complications.  A radial artery compression


band was applied to his right wrist at the end of the case to achieve good


hemostasis.


 


HEMODYNAMIC DATA


Left ventricle 120 with an end-diastolic pressure of 18.  Aorta 113/79 with a


mean of 95.  There was no significant transvalvular aortic gradient on pullback


of the pigtail catheter.


 


CORONARY ARTERIOGRAPHY


The left main is normal.  The left anterior descending is diffusely diseased.


There is likely up to 30% stenosis in the distal third of the proximal LAD.


The mid-LAD has probably up to 40% stenosis diffusely right after the takeoff


of a fairly large branching diagonal.  This diagonals more medial branch, which


is smaller, has up to 40% proximal stenosis.  The mid to distal LAD otherwise


has mild diffuse disease up to 20% severity.  Left circumflex appears to be


totally occluded proximally.  There are fair to good right coronary to obtuse


marginal collaterals.  The right coronary artery is a very large dominant


vessel which is diffusely diseased.  There is likely up to 30% stenosis in the


proximal and midportions and up to 40% stenosis.


 


LEFT VENTRICULOGRAPHY


Contrast injection of the left ventricle reveals severe global hypokinesis.


Ejection fraction is estimated at 15-20%.


 


CONCLUSION


 


1. Severe single-vessel coronary artery disease, a totally occluded but


     collateralized left circumflex.


2. Severely reduced left ventricular systolic function with ejection fraction


     estimated at 15-20%.





NEEDS LIFE VEST AT [facility]


Brief History - From Admission


57-year-old male with a past medical history significant for hypertension and 

type 2 diabetes mellitus who presents with increasing shortness of breath and 

chest tightness 1 day.  The patient reports that he was having shortness of 

breath and chest pressure when he woke up this morning.  His shortness of 

breath was relieved with lying down however when he woke up to take a shower to 

go to work, the pain and shortness of breath returned.  He was previously 

stented in 2011 in [place], however has not been on any medications in quite 

some time.  He carries a diagnosis of hypertension and diabetes however does 

not take any medications because he cannot afford these.  He last saw PCP 

approximately 3 years ago.  Troponin on admission was 0.39.  EKG with no ST 

segment elevations or depressions.  Chest x-ray significant for pulmonary 

vascular congestion.


CBC/BMP:  


11/17/17 0611                                                                  

              11/17/17 0611





Significant Findings





Laboratory Tests








Test


  11/14/17


13:30 11/14/17


15:37 11/14/17


23:48 11/15/17


06:05


 


Troponin I


  0.33 NG/ML


(0.02-0.05) 


  


  


 


 


Monocytes (%) (Auto)


  


  


  


  8.7 %


(0.0-8.0)


 


Activated Partial


Thromboplast Time 


  


  


  30.2 SEC


(24.3-30.1)


 


Random Glucose


  


  


  


  193 MG/DL


()


 


Calcium Level


  


  


  


  8.3 MG/DL


(8.5-10.1)


 


Estimat Glomerular Filtration


Rate 


  


  


  81 ML/MIN


(>89)


 


Test


  11/15/17


19:07 11/16/17


06:07 11/17/17


06:11 


 


 


Monocytes (%) (Auto)


  


  10.2 %


(0.0-8.0) 9.1 %


(0.0-8.0) 


 


 


Monocytes # (Auto)


  


  1.0 TH/MM3


(0-0.9) 


  


 


 


Random Glucose


  


  176 MG/DL


() 156 MG/DL


() 


 


 


Albumin


  


  2.9 GM/DL


(3.4-5.0) 3.0 GM/DL


(3.4-5.0) 


 


 


Calcium Level


  


  8.4 MG/DL


(8.5-10.1) 


  


 


 


Aspartate Amino Transf


(AST/SGOT) 


  14 U/L (15-37) 


  


  


 


 


Sodium Level


  


  135 MEQ/L


(136-145) 


  


 


 


Hemoglobin A1c


  


  8.7 %


(4.3-6.0) 


  


 


 


Free Thyroxine


  


  0.73 NG/DL


(0.76-1.46) 


  


 


 


Estimat Glomerular Filtration


Rate 


  


  81 ML/MIN


(>89) 


 








Imaging





Last Impressions








Chest X-Ray 11/13/17 8908 Signed





Impressions: 





 Service Date/Time:  Tuesday, November 14, 2017 00:10 - CONCLUSION:  Mild 





 pulmonary vascular congestion. Surgical clips overlie left chest. Some bony 





 hypertrophy of the right AC joint.     Jesse Segundo MD 








PE at Discharge


GENERAL: Awake alert oriented 3 --talkative and cooperative


SKIN: Warm and dry.


HEAD: Atraumatic. Normocephalic. 


EYES: Pupils equal and round. No scleral icterus. No injection or drainage.  

Extraocular muscles intact


ENT: No nasal bleeding or discharge.  Mucous membranes pink and moist.  Tongue 

is midline


NECK: Trachea midline. No JVD.  Neck is supple


CARDIOVASCULAR: Regular rate and rhythm.  S1-S2 no S3-S4 no heave or thrill or 

rub or gallop


RESPIRATORY: No accessory muscle use. Clear to auscultation. Breath sounds 

equal bilaterally. 


GASTROINTESTINAL: Abdomen soft, non-tender, nondistended. Hepatic and splenic 

margins not palpable.  Obese


MUSCULOSKELETAL: Extremities without clubbing, cyanosis, or edema. No obvious 

deformities. 


NEUROLOGICAL: Awake and alert. No obvious cranial nerve deficits.  Motor 

grossly within normal limits. Five out of 5 muscle strength in the arms and 

legs.  Normal speech.


PSYCHIATRIC: Appropriate mood and affect; insight and judgment normal.


Hospital Course


57-year-old male with a past medical history significant for hypertension and 

type 2 diabetes mellitus who presents with increasing shortness of breath and 

chest tightness 1 day.  The patient reports that he was having shortness of 

breath and chest pressure when he woke up this morning.  His shortness of 

breath was relieved with lying down however when he woke up to take a shower to 

go to work, the pain and shortness of breath returned.  He was previously 

stented in 2011 in [place], however has not been on any medications in quite 

some time.  He carries a diagnosis of hypertension and diabetes however does 

not take any medications because he cannot afford these.  He last saw PCP 

approximately 3 years ago.  Troponin on admission was 0.39.  EKG with no ST 

segment elevations or depressions.  Chest x-ray significant for pulmonary 

vascular congestion.





11/14/17-patient seen and examined and currently he is free of chest pain








11-15 NO CHEST PAIN


DW RN AND PT


TO HAVE CARDIAC CATH LATER TODAY


AM LABS





11-16  CATH SHOWED LOW EF 15-20 WILL NEED LIFE VEST BEFORE DISCHARGE


DW [facility] REP AND PATIENT AND RN


NEEDS TO STOP SMOKING


WILL NEED FOLLOWUPS AFTER DC


WORKS AT [employer]


DENIES CHEST PAIN OR PALPITATIONS AT THIS TIME





11-17 POSSIBLE DC TO HOME TODAY IF CAN GET LIFE VEST


WILL CLEAR IF [facility] CLEARS HIM TO HAVE THE VEST


DW RN AND PATIENT


BLOOD SUGARS BETTER TODAY


Pt Condition on Discharge:  Fair


Discharge Disposition:  Discharge Home


Discharge Time:  > 30 minutes


Discharge Instructions


DIET: Follow Instructions for:  Heart Healthy Diet, Diabetic Diet


Speech Therapy-Diet Recommends:  Regular


Activities you can perform:  Regular-No Restrictions


Follow up Referrals:  


Cardiology - 2 Weeks with Lyndon Bonds MD


PCP Follow-up - 1 Week





New Medications:  


Alcohol Swabs (Alcohol Prep Pads) 70 % Pad


BOX .ROUTE AS DIRECTED for Aseptic process, #1 0 Refills





Lancets (Lancets) 1 Mis Mis


EA .ROUTE AS DIRECTED for Blood Sugar Management, #100 0 Refills





Nicotine Patch (Nicotine Patch) 14 Mg/24 Hr Patch


14 MG T-DERMAL DAILY for Smoking Cessation, #30 PATCH 0 Refills





Nitroglycerin SL (Nitroglycerin SL) 0.4 Mg Subl


0.4 MG SL AS DIRECTED PRN for CHEST PAIN, #100 TAB.SL 0 Refills


ONE TABLET UNDER THE TONGUE AS NEEDED FOR CHEST PAIN, MAY REPEAT EVERY


 FIVE MINUTES FOR A TOTAL OF 3 DOSES OR CALL 911 IF NO RELIEF


Onetouch Ultra Mini W/Device (Onetouch Ultra Mini W/Device) 1 Kit Kit


KIT .ROUTE AS DIRECTED for Blood Sugar Management, #1





Onetouch Ultra Test Strips (Onetouch Ultra Test Strips) 1 Amy Amy


STRIP .ROUTE AS DIRECTED for Blood Sugar Management, #100 0 Refills





Aspirin (Px Aspirin) 325 Mg Tab


325 MG PO DAILY for Blood Clot Prevention, #31 TAB 3 Refills





Atorvastatin (Lipitor) 10 Mg Tab


10 MG PO HS for Cholesterol Management, #30 TAB





Carvedilol (Coreg) 3.125 Mg Tab


3.125 MG PO Q12HR for Blood Pressure Management, #60 TAB 3 Refills





Enalapril (Enalapril) 2.5 Mg Tab


2.5 MG PO BID for Blood Pressure Management, #60 TAB





Isosorbide Mononitrate ER (Isosorbide Mononitrate ER) 30 Mg Mary Carmen


30 MG PO DAILY@07 for Chest Pain, #30 TAB





Metformin (Glucophage) 500 Mg Tab


500 MG PO BIDPC for Blood Sugar Management, #60 TAB








Additional Information


DC TO HOME TODAY IF GETS LIFEVEST











Donte Mclean DO Nov 17, 2017 10:03

## 2018-02-27 ENCOUNTER — HOSPITAL ENCOUNTER (OUTPATIENT)
Dept: HOSPITAL 17 - HCAT | Age: 58
LOS: 1 days | Discharge: HOME | End: 2018-02-28
Payer: COMMERCIAL

## 2018-02-27 VITALS
SYSTOLIC BLOOD PRESSURE: 131 MMHG | DIASTOLIC BLOOD PRESSURE: 85 MMHG | HEART RATE: 72 BPM | OXYGEN SATURATION: 96 % | RESPIRATION RATE: 18 BRPM | TEMPERATURE: 98.4 F

## 2018-02-27 VITALS — BODY MASS INDEX: 43.81 KG/M2 | HEIGHT: 67 IN | WEIGHT: 279.11 LBS

## 2018-02-27 VITALS
RESPIRATION RATE: 20 BRPM | OXYGEN SATURATION: 96 % | HEART RATE: 78 BPM | SYSTOLIC BLOOD PRESSURE: 149 MMHG | DIASTOLIC BLOOD PRESSURE: 80 MMHG | TEMPERATURE: 98.7 F

## 2018-02-27 VITALS
TEMPERATURE: 97.9 F | OXYGEN SATURATION: 97 % | SYSTOLIC BLOOD PRESSURE: 164 MMHG | HEART RATE: 76 BPM | RESPIRATION RATE: 20 BRPM | DIASTOLIC BLOOD PRESSURE: 96 MMHG

## 2018-02-27 VITALS — HEART RATE: 82 BPM

## 2018-02-27 VITALS — HEART RATE: 78 BPM

## 2018-02-27 VITALS — HEART RATE: 86 BPM

## 2018-02-27 VITALS — HEART RATE: 74 BPM

## 2018-02-27 VITALS — HEART RATE: 72 BPM

## 2018-02-27 DIAGNOSIS — E11.9: ICD-10-CM

## 2018-02-27 DIAGNOSIS — K21.9: ICD-10-CM

## 2018-02-27 DIAGNOSIS — I42.0: Primary | ICD-10-CM

## 2018-02-27 DIAGNOSIS — I25.10: ICD-10-CM

## 2018-02-27 DIAGNOSIS — E78.5: ICD-10-CM

## 2018-02-27 DIAGNOSIS — I10: ICD-10-CM

## 2018-02-27 DIAGNOSIS — I25.2: ICD-10-CM

## 2018-02-27 DIAGNOSIS — I50.9: ICD-10-CM

## 2018-02-27 DIAGNOSIS — Z79.82: ICD-10-CM

## 2018-02-27 DIAGNOSIS — E66.9: ICD-10-CM

## 2018-02-27 DIAGNOSIS — Z79.84: ICD-10-CM

## 2018-02-27 DIAGNOSIS — F17.210: ICD-10-CM

## 2018-02-27 LAB
BASOPHILS # BLD AUTO: 0.1 TH/MM3 (ref 0–0.2)
BASOPHILS NFR BLD: 0.5 % (ref 0–2)
BUN SERPL-MCNC: 16 MG/DL (ref 7–18)
CALCIUM SERPL-MCNC: 8.6 MG/DL (ref 8.5–10.1)
CHLORIDE SERPL-SCNC: 102 MEQ/L (ref 98–107)
CREAT SERPL-MCNC: 0.93 MG/DL (ref 0.6–1.3)
EOSINOPHIL # BLD: 0.2 TH/MM3 (ref 0–0.4)
EOSINOPHIL NFR BLD: 2.3 % (ref 0–4)
ERYTHROCYTE [DISTWIDTH] IN BLOOD BY AUTOMATED COUNT: 13.3 % (ref 11.6–17.2)
GFR SERPLBLD BASED ON 1.73 SQ M-ARVRAT: 84 ML/MIN (ref 89–?)
GLUCOSE SERPL-MCNC: 183 MG/DL (ref 74–106)
HCO3 BLD-SCNC: 27 MEQ/L (ref 21–32)
HCT VFR BLD CALC: 46.7 % (ref 39–51)
HGB BLD-MCNC: 16.4 GM/DL (ref 13–17)
INR PPP: 1.1 RATIO
LYMPHOCYTES # BLD AUTO: 4 TH/MM3 (ref 1–4.8)
LYMPHOCYTES NFR BLD AUTO: 40.7 % (ref 9–44)
MCH RBC QN AUTO: 32.9 PG (ref 27–34)
MCHC RBC AUTO-ENTMCNC: 35.1 % (ref 32–36)
MCV RBC AUTO: 93.8 FL (ref 80–100)
MONOCYTE #: 0.9 TH/MM3 (ref 0–0.9)
MONOCYTES NFR BLD: 8.6 % (ref 0–8)
NEUTROPHILS # BLD AUTO: 4.7 TH/MM3 (ref 1.8–7.7)
NEUTROPHILS NFR BLD AUTO: 47.9 % (ref 16–70)
PLATELET # BLD: 302 TH/MM3 (ref 150–450)
PMV BLD AUTO: 7.3 FL (ref 7–11)
PROTHROMBIN TIME: 10.7 SEC (ref 9.8–11.6)
RBC # BLD AUTO: 4.98 MIL/MM3 (ref 4.5–5.9)
SODIUM SERPL-SCNC: 137 MEQ/L (ref 136–145)
WBC # BLD AUTO: 9.9 TH/MM3 (ref 4–11)

## 2018-02-27 PROCEDURE — 80048 BASIC METABOLIC PNL TOTAL CA: CPT

## 2018-02-27 PROCEDURE — 93641 EP EVL 1/2CHMB PAC CVDFB TST: CPT

## 2018-02-27 PROCEDURE — 71045 X-RAY EXAM CHEST 1 VIEW: CPT

## 2018-02-27 PROCEDURE — 93005 ELECTROCARDIOGRAM TRACING: CPT

## 2018-02-27 PROCEDURE — C1777 LEAD, AICD, ENDO SINGLE COIL: HCPCS

## 2018-02-27 PROCEDURE — 00530 ANES PERM TRANSVNS PM INSJ: CPT

## 2018-02-27 PROCEDURE — 85730 THROMBOPLASTIN TIME PARTIAL: CPT

## 2018-02-27 PROCEDURE — 85610 PROTHROMBIN TIME: CPT

## 2018-02-27 PROCEDURE — 85025 COMPLETE CBC W/AUTO DIFF WBC: CPT

## 2018-02-27 PROCEDURE — 33249 INSJ/RPLCMT DEFIB W/LEAD(S): CPT

## 2018-02-27 PROCEDURE — C1722 AICD, SINGLE CHAMBER: HCPCS

## 2018-02-27 RX ADMIN — CARVEDILOL SCH MG: 3.12 TABLET, FILM COATED ORAL at 23:17

## 2018-02-27 RX ADMIN — ENALAPRIL MALEATE SCH MG: 2.5 TABLET ORAL at 23:17

## 2018-02-27 NOTE — RADRPT
EXAM DATE/TIME:  02/27/2018 15:38 

 

[facility] COMPARISON:     

CHEST SINGLE AP, November 14, 2017, 0:10.

 

                     

INDICATIONS :     

Shortness of breath. Evaluate for pneumonia.

                     

 

MEDICAL HISTORY :     

None.          

 

SURGICAL HISTORY :     

Pacemaker.   

 

ENCOUNTER:     

Initial                                        

 

ACUITY:     

1 day      

 

PAIN SCORE:     

0/10

 

LOCATION:     

Bilateral  chest

 

FINDINGS:     

2 AP portable semierect views of the chest were obtained and demonstrate interval placement of a left
 subclavian transvenous pacer with no pneumothorax. The heart size remains at the upper limits of nor
mal with no evidence of pulmonary edema. There are no confluent infiltrates or effusions. Multiple galvin
rgical clips and staples are again noted projected over the left side of the chest.

 

CONCLUSION:     

1. Interval placement of left subclavian transvenous pacer with no pneumothorax.

2. No evidence of pneumonia.

 

 

 

 Jacinto Ralph MD on February 27, 2018 at 16:08           

Board Certified Radiologist.

 This report was verified electronically.

## 2018-02-27 NOTE — MP
cc:

Kamari Bonds MD

****

 

 

DATE OF OPERATION:

02/27/2018

 

PROCEDURE:

Single-chamber AICD implantation via left subclavian vein, AICD 

defibrillation threshold testing.

 

OPERATIVE NOTE:

The patient was brought to the operating suite in a fasting state 

after having signed informed consent.  The left upper chest was 

prepped and draped as per policy and anesthetized with 1% lidocaine.  

A transverse incision was made inferior to the left clavicle and using

blunt dissection a subcutaneous pocket was formed down to the pectoralis 

fascia.  After administration of dye through a left arm peripheral IV 

central venous access was obtained once via the left subclavian vein 

and a 9-Maltese sheath placed.  Through this sheath a ventricular 

active fixation lead was introduced and its tip positioned in the 

right ventricular apex for good current of injury, stimulation 

threshold (0.6 volts) and sensitivity (21.3 millivolts) were 

demonstrated.  This lead also has atrial sensing and the P-wave was 

measured at 10.9 millivolts.  This lead was secured into place using 2-0 Silk 

ties onto the pectoralis fascia.  The lead was then connected to the 

AICD generator which is a Biotronik Intica device.  The lead and the 

generator were placed in the subcutaneous pocket which was partially 

closed using 3-0 Vicryl interrupted stitches in 3 layers to close the 

subcutaneous tissue.

 

Testing of the device was then performed.  Ventricular fibrillation 

was induced.  The patient was successfully rescued with a 25 joule 

shock after a charge time of 5 seconds.  The pocket was further closed

using 4-0 Monocryl running stitch to close the subcuticular tissue.  

There were no apparent immediate complications.  A portable chest 

x-ray is pending at the time of this dictation.

 

CONCLUSIONS:

1.  Status post successful single-chamber (with atrial sensing capability)  
AICD implantation via the 

left subclavian vein using a Biotronik Intica AICD generator.

2.  Status post AICD defibrillation threshold testing.

 

 

 

__________________________________

MD DANIELA Ambrocio/LILIAN/rh

D: 02/27/2018, 03:05 PM

T: 02/27/2018, 03:23 PM

Visit #: [id number]

Job #: [id number]

[facility]

## 2018-02-28 VITALS — HEART RATE: 76 BPM

## 2018-02-28 VITALS
RESPIRATION RATE: 18 BRPM | HEART RATE: 72 BPM | TEMPERATURE: 98 F | SYSTOLIC BLOOD PRESSURE: 144 MMHG | OXYGEN SATURATION: 95 % | DIASTOLIC BLOOD PRESSURE: 77 MMHG

## 2018-02-28 VITALS
TEMPERATURE: 97.7 F | HEART RATE: 68 BPM | RESPIRATION RATE: 20 BRPM | DIASTOLIC BLOOD PRESSURE: 100 MMHG | OXYGEN SATURATION: 95 % | SYSTOLIC BLOOD PRESSURE: 163 MMHG

## 2018-02-28 VITALS
RESPIRATION RATE: 20 BRPM | OXYGEN SATURATION: 93 % | DIASTOLIC BLOOD PRESSURE: 87 MMHG | HEART RATE: 69 BPM | SYSTOLIC BLOOD PRESSURE: 155 MMHG | TEMPERATURE: 98.5 F

## 2018-02-28 VITALS
DIASTOLIC BLOOD PRESSURE: 93 MMHG | OXYGEN SATURATION: 96 % | RESPIRATION RATE: 22 BRPM | SYSTOLIC BLOOD PRESSURE: 175 MMHG | HEART RATE: 75 BPM | TEMPERATURE: 98 F

## 2018-02-28 VITALS — HEART RATE: 72 BPM

## 2018-02-28 VITALS — HEART RATE: 68 BPM

## 2018-02-28 VITALS — HEART RATE: 66 BPM

## 2018-02-28 VITALS — HEART RATE: 64 BPM

## 2018-02-28 VITALS — HEART RATE: 70 BPM

## 2018-02-28 VITALS — HEART RATE: 80 BPM

## 2018-02-28 VITALS — HEART RATE: 84 BPM

## 2018-02-28 RX ADMIN — CARVEDILOL SCH MG: 3.12 TABLET, FILM COATED ORAL at 10:30

## 2018-02-28 RX ADMIN — ENALAPRIL MALEATE SCH MG: 2.5 TABLET ORAL at 10:31

## 2018-02-28 NOTE — EKG
Date Performed: 02/27/2018       Time Performed: 12:50:52

 

PTAGE:      57 years

 

EKG:      Sinus rhythm 

 

. Lateral T wave changes may be due to myocardial ischemia Abnormal ECG

 

PREVIOUS TRACING       : 11/14/2017 05.53 Since the prior tracing, there has been no significant nguyen


 

DOCTOR:   Isael Byrne  Interpretating Date/Time  02/28/2018 12:21:48

## 2018-02-28 NOTE — PD.CARD.PN
Subjective


Subjective Remarks


Denies dyspnea, angina, dizziness.  Minimal to mild incisional soreness.





Objective


Medications











Item Value  Date Time


 


Aspirin 325 mg 2/28/18 0900





 (Aspirin) DAILY/PO 


 


Isosorbide 30 mg 2/28/18 0700





Mononitrate DAILY@07/PO 2/28/18 0642





 (Imdur)  


 


Atorvastatin 10 mg 2/27/18 2100





Calcium HS/PO 2/27/18 2317





 (Lipitor)  


 


Carvedilol 3.125 mg 2/27/18 2100





 (Coreg) Q12HR/PO 2/27/18 2317


 


Enalapril Maleate 2.5 mg 2/27/18 2100





 (Vasotec) BID/PO 2/27/18 2317











Current Medications








 Medications


  (Trade)  Dose


 Ordered  Sig/Maximo


 Route  Start Time


 Stop Time Status Last Admin


 


 Sodium Chloride  1,000 ml @ 


 30 mls/hr  Q24H


 IV  2/27/18 12:30


     


 


 


 Cefazolin Sodium/


 Dextrose  50 ml @ 


 100 mls/hr  ON  CALL


 IV  2/27/18 12:30


 3/2/18 12:29  2/27/18 13:50


 


 


  (Betadine 5%


 Antisepsis Kit)  2 applic  ON  CALL


 EACH NARE  2/27/18 12:30


 3/2/18 12:29  2/27/18 12:45


 


 


  (Bactroban Nasal


 2% Oint)  1 applic  ON  CALL


 NASAL  2/27/18 12:30


 3/2/18 12:29   


 


 


  (Chlorhexidine


 2% Cloth)  3 pack  ON  CALL


 TOPICAL  2/27/18 12:30


 3/2/18 12:29  2/27/18 12:45


 


 


 Vancomycin HCl


 1000 mg/Sodium


 Chloride  250 ml @ 


 250 mls/hr  ON  CALL


 IV  2/27/18 12:30


 3/2/18 12:29  2/27/18 13:55


 


 


 Lactated Ringer's  1,000 ml @ 


 30 mls/hr  Q24H PRN


 IV  2/27/18 12:30


 3/2/18 12:29   


 


 


 Sodium Chloride  500 ml @ 


 30 mls/hr  [id number] PRN


 IV  2/27/18 12:30


 3/2/18 12:29   


 


 


  (Lopressor)  25 mg  ON CALL  PRN


 PO  2/27/18 12:30


 3/2/18 12:29   


 


 


  (Betadine 5%


 Antisepsis Kit)  1 applic  ON CALL  PRN


 EACH NARE  2/27/18 12:30


 3/2/18 12:29   


 


 


  (Chlorhexidine


 2% Cloth)  3 pack  ON CALL  PRN


 TOPICAL  2/27/18 12:30


 3/2/18 12:29   


 


 


  (NovoLIN R INJ)  See


 Protocol


 Table ...  ON CALL  PRN


 SQ  2/27/18 12:30


 3/2/18 12:29   


 


 


  (Aspirin)  325 mg  DAILY


 PO  2/28/18 09:00


     


 


 


  (Lipitor)  10 mg  HS


 PO  2/27/18 21:00


    2/27/18 23:17


 


 


  (Coreg)  3.125 mg  Q12HR


 PO  2/27/18 21:00


    2/27/18 23:17


 


 


  (Vasotec)  2.5 mg  BID


 PO  2/27/18 21:00


    2/27/18 23:17


 


 


  (Imdur)  30 mg  DAILY@07


 PO  2/28/18 07:00


    2/28/18 06:42


 


 


  (Ambien)  5 mg  HS  PRN


 PO  2/27/18 15:15


     


 


 


  (Ultram)  50 mg  Q6HR  PRN


 PO  2/27/18 15:15


     


 


 


 Miscellaneous


 Information  ALL


 NURSING


 DEPARTME...  UNSCH  PRN


 .XX  2/27/18 15:21


 2/28/18 15:20   


 








Vital Signs / I&O





Vital Signs








  Date Time  Temp Pulse Resp B/P (MAP) Pulse Ox O2 Delivery O2 Flow Rate FiO2


 


2/28/18 07:59 98.5 69 20 155/87 (109) 93   


 


2/28/18 06:00  66      


 


2/28/18 05:00  70      


 


2/28/18 04:00 97.7 76 20 163/100 (121) 95   


 


2/28/18 04:00  68      


 


2/28/18 03:00  84      


 


2/28/18 02:00  68      


 


2/28/18 01:00  70      


 


2/28/18 00:00  71      


 


2/28/18 00:00 98.0 75 22 175/93 (120) 96   


 


2/27/18 23:00  72      


 


2/27/18 22:00  74      


 


2/27/18 21:00  78      


 


2/27/18 20:00  81      


 


2/27/18 20:00 98.7 78 20 149/80 (103) 96   


 


2/27/18 19:00  86      


 


2/27/18 18:00  82      


 


2/27/18 17:00 98.4 72 18 131/85 (100) 96   


 


2/27/18 17:00  73      


 


2/27/18 16:00 97.6 69 17 129/72 (91) 95 Nasal Cannula 2 


 


2/27/18 15:45  73 18 132/64 (86) 95 Nasal Cannula 2 


 


2/27/18 15:30  73 19 140/85 (103) 96 Nasal Cannula 2 


 


2/27/18 15:21 97.6 74 20 148/79 (102) 96 Nasal Cannula 2 


 


2/27/18 12:39 97.9 76 20 164/96 (118) 97   














I/O      


 


 2/27/18 2/27/18 2/27/18 2/28/18 2/28/18 2/28/18





 07:00 15:00 23:00 07:00 15:00 23:00


 


Intake Total   0 ml 480 ml  


 


Output Total    925 ml  


 


Balance   0 ml -445 ml  


 


      


 


Intake Oral    480 ml  


 


IV Total   0 ml   


 


Output Urine Total    925 ml  








Physical Exam


ICD site clean, dry, intact, no hematoma or tenderness.


Laboratory





Laboratory Tests








Test


  2/27/18


12:25


 


White Blood Count 9.9 TH/MM3 


 


Red Blood Count 4.98 MIL/MM3 


 


Hemoglobin 16.4 GM/DL 


 


Hematocrit 46.7 % 


 


Mean Corpuscular Volume 93.8 FL 


 


Mean Corpuscular Hemoglobin 32.9 PG 


 


Mean Corpuscular Hemoglobin


Concent 35.1 % 


 


 


Red Cell Distribution Width 13.3 % 


 


Platelet Count 302 TH/MM3 


 


Mean Platelet Volume 7.3 FL 


 


Neutrophils (%) (Auto) 47.9 % 


 


Lymphocytes (%) (Auto) 40.7 % 


 


Monocytes (%) (Auto) 8.6 % 


 


Eosinophils (%) (Auto) 2.3 % 


 


Basophils (%) (Auto) 0.5 % 


 


Neutrophils # (Auto) 4.7 TH/MM3 


 


Lymphocytes # (Auto) 4.0 TH/MM3 


 


Monocytes # (Auto) 0.9 TH/MM3 


 


Eosinophils # (Auto) 0.2 TH/MM3 


 


Basophils # (Auto) 0.1 TH/MM3 


 


CBC Comment DIFF FINAL 


 


Differential Comment  


 


Prothrombin Time 10.7 SEC 


 


Prothromb Time International


Ratio 1.1 RATIO 


 


 


Activated Partial


Thromboplast Time 28.5 SEC 


 


 


Blood Urea Nitrogen 16 MG/DL 


 


Creatinine 0.93 MG/DL 


 


Random Glucose 183 MG/DL 


 


Calcium Level 8.6 MG/DL 


 


Sodium Level 137 MEQ/L 


 


Potassium Level 3.6 MEQ/L 


 


Chloride Level 102 MEQ/L 


 


Carbon Dioxide Level 27.0 MEQ/L 


 


Anion Gap 8 MEQ/L 


 


Estimat Glomerular Filtration


Rate 84 ML/MIN 


 








Imaging





Last 24 hours Impressions








Chest X-Ray 2/27/18 1507 Signed





Impressions: 





 Service Date/Time:  Tuesday, February 27, 2018 15:38 - CONCLUSION:  1. 

Interval 





 placement of left subclavian transvenous pacer with no pneumothorax. 2. No 





 evidence of pneumonia.     Jacinto Ralph MD 











Assessment and Plan


Problem List:  


(1) S/P implantation of automatic cardioverter/defibrillator (AICD)


ICD Codes:  Z95.810 - Presence of automatic (implantable) cardiac defibrillator


Status:  Acute


Plan:  Stable overnight.  AICD site OK.  AICD function appropriate by re-

interrogation this morning.  Discharge today, same home medications plus 

Levaquin 500 mg qd for 7 days, one week f/u.





(2) CAD (coronary artery disease)


ICD Codes:  I25.10 - Atherosclerotic heart disease of native coronary artery 

without angina pectoris


Status:  Chronic


Plan:  Stable.  No recent angina.  Continue medical therapy.





(3) Dilated cardiomyopathy


ICD Codes:  I42.0 - Dilated cardiomyopathy


Status:  Chronic


Plan:  Stable.  Compensated.  No CHF.  Continue beta blocker, ACE-I.  





Code Status


full code


Discussed Condition With


patient and wife





Problem Qualifiers





(1) CAD (coronary artery disease):  


Qualified Codes:  I25.10 - Atherosclerotic heart disease of native coronary 

artery without angina pectoris








Kamari Bonds MD Feb 28, 2018 08:16

## 2018-02-28 NOTE — EKG
Date Performed: 02/28/2018       Time Performed: 06:23:52

 

PTAGE:      57 years

 

EKG:      Sinus rhythm 

 

 Prolonged QT interval Possible anteroseptal infarct - age undetermined Lateral T wave changes may be
 due to myocardial ischemia Abnormal ECG

 

PREVIOUS TRACING       : 02/27/2018 12.50 Since the prior tracing, there has been no significant nguyen


 

DOCTOR:   Isael Byrne  Interpretating Date/Time  02/28/2018 12:21:40

## 2018-03-07 NOTE — CATHPROC
[employer] HIS Report

Study Information

Study Number    Admission           Scheduled Start              Study Start

 

[id number]    Feb 27 2018 11:50AM      02/27/2018 Feb 27 2018 1:30PM

 

Universal Service

 

Cardiac Pacer/ICD

 

Admit Source                Facility Department

 

Other                   [facility] - Cath Lab

 

Physician and Clinical Staff

Initial Kamari Graff            Circulator      Alberto Farley,RN

 

                          Other        Anesthesia, CRNA

 

                          Recorder       Nicole Verdin ,BSN

 

                          Recorder       Beatrice Gallegos,GEORGE TECH2

 

                          Scrub        Meg Hearn RT(R)

 

Procedures Performed

Procedure                     Location (Site)             Vessel Name

 

Lead Insertion

Venogram                      Subclav. Vein (Lft            Subclavian Vein

Equipment

Time          Description          Size       Mfg Part Number  Used/Scraped

                  DEFIBRILLATOR, INTICA 7 VR-T

14:40   BIOTRONIK                        VVE-VDDR     [id number]      Used

                  DX

14:30   [employer]        LEAD, PLEXA PRO-MRI DF 65/15          [id number]      Used

                                          [id number]

13:32   [employer]    SUTURE, STRIP PLUS 1/2"    *                 Used

                                          *[id number]

13:32   [employer]LINE PACER      ADHESIVE, MASTISOL 2/3CC    2/3CC      0523-48      Used

 

13:32   MEDLINE PACER      HAQUE, LIMB          *        2530 *[id number]   Used

                                          [id number]

13:32   [employer] PACER      PACK, PACER CUSTOM       *                 Used

                                          *[id number]

                                          [id number]

13:32   [employer]LINE PACER      PEN, SKIN DUAL W/ RULER    *                 Used

                                          *[id number]

13:55   [employer]   SAFE SHEATH, FR8, 13CM     FR 8       [id number]     Used

 

14:32   [employer]   SAFE SHEATH, LONG, FR8, 25CM  FR 8       [id number]     Used

 

13:59   Needle Sponge Count   2                        22        Used

 

13:59   Needle Sponge Count   3                        3         Used

 

13:59   Needle Sponge Count   30                       1         Used

 

14:34   Needle Sponge Count   4                        4         Used

 

14:15   [employer]         OMNIPAQUE, 300 MG, 50ML    50ML       [id number]      Used

 



                                          [id number]



                                          *[id number]

                  SUTURE, 3-0 VICRYL [SH]



                  ([id number])

                  SUTURE, 3-0 VICRYL [SH]



                  ([id number])

                  SUTURE, 4-0 MONOCRYL [PS2]



                  (Y496G)

                                          [id number]

13:32   [employer]      BLANKET,WARM AIR CCL      *                 Used

                                          *[id number]

     [facility]      PAD, ELECTROSURGICAL

13:32                               *         *[id number] Used

     SURGICAL         GROUNDING ORANGE

                                          [id number]

13:32   [employer].    /               *                 Used

                                          *[id number]

 

Equipment Model, Serial, Lot Number and Expiration Data

Description            Model Number      Serial Number   Lot Number       Expiration Date

 

DEFIBRILLATOR, INTICA 7 VR-T DX [id number]          [id number]                  05-

 

LEAD, PLEXA PRO-MRI DF 65/15    [id number]         [id number]                  11-

 

 

History: Allergies

Allergy             Reaction

 

No Known Allergies

History: Risk Factors

                       Family History of

Hypertension    Dyslipidemia                    Previous MI     Previous Heart Failure

                       Premature CAD

Yes         Yes           No            No         No

Prior Valve

          Prior PCI        Prior CABG

Surgery

No         No            No

          Cerebrovascular      Peripheral Artery     Chronic Lung

On Dialysis                                        Diabetes     Diabetes Therapy

          Disease          Disease          Disease

No         No            No            No         Yes       Oral

 

 

Labs

Hgb (g/dl)        Hct (%)         RBC (MIL/MM3)       WBC (l/cumm)       Platelets (thousands)

 

11.60-17.00       35.00-51.00       4.00-5.90         4.00-11.00        150..00

 

16.4           46.7           4.9            9.9            302

 

Glucose (mg/dl)     BUN (mg/dl)       Creatinine (mg/dl)    BUN:Creatinine (1:x)

74..00       7.00-18.00        0.50-1.30         10.00-20.00

 

183           16            0.9            17.8

 

Na (meq/l)        K (meq/l)

 

136..00      3.50-5.10

 

137           3.6

 

INR (PTT:PT)

 

0.90-1.10

 

1.1

 

CPK-MB (ng/ML)

 

0.50-3.60

 

Not Drawn

 

 

 

 

Medication

Medication Total Dose (Bolus/Oral)

Medication              Total Dosage/Unit

 

2% XYLOCAINE                10 mL

 

Medications (Bolus/Oral)

Medication           Time Given         Dosage/Unit       Administered By      Reason

 

2% XYLOCAINE          2/27/2018 2:12:37 PM      10 mL        Kamari Bonds

10 mL 2% XYLOCAINE given in lab by Kamari Bonds in Left chest via Subcutaneous. Ordered by Monica Bonds.

Medication (Drip)

Medication          Time Given          Dosage/Unit      Concentration/Unit  Diluent (ml)  Solution

 

ANCEF            2/27/2018 1:50:26 PM       2 g

2 g ANCEF given in lab by Anesthesia CRNA in Left Antecubital via Peripheral IV. Ordered by PRAVIN Bonds.

 

IV Solutions         2/27/2018 1:52:34 PM      50 mL (IV)                        NaCl .9

Patient arrived on IV Solutions given by Anesthesia, CRNA in Right Forearm via Peripheral IV. Pump/Dr
ip Flow using NaCl .9. Ordered by Kamari Bonds.

IV Solutions         2/27/2018 1:52:56 PM      50 mL (IV)                        NaCl .9

Patient arrived on IV Solutions given by Anesthesia, CRNA in Left Antecubital via Peripheral IV. Pump
/Drip Flow using NaCl .9. Ordered by Kamari Bonds.

VANCOMYCIN DRIP       2/27/2018 1:55:46 PM       1 g

1 g VANCOMYCIN DRIP given in lab by Anesthesia, CRNA in Left Antecubital via Peripheral IV. Ordered b
Kamari Graves.

 

 

Initial Case Assessment

Cardiovascular

HR             Rhythm           NIBP

 

82             sr             137/84

 

Edema Present        Skin color             Skin

 

None            Normal               Warm Dry

 

Neurological State

              Oriented to time-place-

Alert                       Moves all extremities

              person

 

Respiration - General

Respiration Rate

              SpO2 (%)          O2 (lpm)

(B/min)

16             98             0

 

Chronological Log

Time      Study Chronological Log

 

13:34:40    Patient arrived via Bed.

 

13:34:45    Patient Name, D.O.B, / Armband Verified By R.N.

 

13:34:55    Anesthesia at bedside. Assumes care of patient. See anesthesia flowsheet for q5min vitals


 

13:35:00    Presedation assessment performed by Cath Lab RN.

 

13:35:48    Consent signed by the physician and the patient and verified by the Cath Lab staff.

 

13:36:10    Skin Breakdown- none per patient

 

13:36:40    Patient has been NPO for More than 6Hrs.

 

13:41:54    Patient Warmer Placed on the Table.

 

13:42:00    Disposable Defibrillator Pads Placed On Patient.

 

13:42:04    Cordell Prominences Protected

        Assessment: Initial Case, HR=82 BPM, Rhythm=sr, FMYD=852/84 mmhg, Edema=None, Color=Normal, S
kin = Warm,

        Dry

13:43:05

        Neurological: State=Alert, Ox3, [place]

        Respiration: Resp=16 B/min, SpO2=98 %, O2=0 lpm

13:43:57    Table restraints applied according to hospital policy

 

13:48:08    Bovie ground pad applied to: right thigh

 



13:50:26  2 g ANCEF given in lab by Anesthesia, CRNA in Left Antecubital via Peripheral IV. Ordered Kamari Alvarez.

 

13:51:07  Left Upper Chest Prepped Times Two.

 

13:52:11  A # 20 IV was noted in the Forearm (right). Grade = 0

 

13:52:26  A # 20 IV was noted in the Antecubital (left). Grade = 0

      Patient arrived on IV Solutions given by Anesthesia, CRNA in Right Forearm via Peripheral IV. P
ump/Drip Flow using

13:52:34

      NaCl .9. Ordered by Kamari Bonds.

      Patient arrived on IV Solutions given by Anesthesia, CRNA in Left Antecubital via Peripheral IV
. Pump/Drip Flow using

13:52:56

      NaCl .9. Ordered by Kamari Bonds.

13:53:03  History and physical on the chart or being dictated.

 

13:55:46  1 g VANCOMYCIN DRIP given in lab by Anesthesia, CRNA in Left Antecubital via Peripheral IV.
 Ordered by Kamari Bonds.

 

13:58:53  2% CHLORHEXIDINE GLUCONATE WASH AND NASAL SWIPE DONE PRIOR TO PROCEDURE.

 



      First Sponge And Instrument Count Done by Meg Hearn, RT(R).

13:58:58  Hypo's: 4, Sponges: 30, Bovie/scratch: 2

      Sutures: 6, Blades: 2, Instruments: 26, Syveck Patches: 0

13:59:54  Reference ECG taken

 

14:05:12  MD paged

 

14:06:18  MD responded

 

14:10:06  MD arrived.

      Time Out. Correct patient, procedure, procedure equipment, site and side verified with physicia
n present. Time

14:12:22

      concurred by MD, individual staff and CRNA.

      Time Out #2 - Consents verified, patient in correct position, all results are labled and displa
yed, safety precautions

14:12:23

      taken, antibiotics administered. Time out concurred by MD, individual staff and CRNA in procedu
re

14:12:31  Case Start

 

14:12:37  10 mL 2% XYLOCAINE given in lab by Kamari Bonds in Left chest via Subcutaneous. Ordered by 
Kamari Bonds.

 

14:13:25  The Subclav. Vein (Lft was manually injected with 10 cc's of contrast. OMNIPAQUE, 300 MG, 5
0ML 50ML used.

 

14:15:05  Surgical Incision Made.

 

14:15:12  A pocket was created at the  L Upper Chest.

 

14:15:38  Two dry sponges put into the surgical pocket.

 

14:28:55  Vascular access was obtained in the Subclav. Vein (Lft.

 

14:28:58  A SAFE SHEATH, FR8, 13CM FR 8 was advanced into the Subclav. Vein (Lft using the Percutaneo
us technique.

 

14:29:50  A LEAD, PLEXA PRO-MRI DF 65/15 was inserted and positioned in the RV.

 

14:30:38  Sponges removed from the surgical pocket.

 

14:32:20  Lead removed

      A SAFE SHEATH, LONG, FR8, 25CM FR 8 was exchanged in the Subclav. Vein (Lft. This was necessary
 in order for

14:32:30

      catheter support.

14:33:42  A LEAD, PLEXA PRO-MRI DF 65/15 was inserted and positioned in the RV.

 

14:35:52  Lead placement verified under fluoroscopy

 

14:36:13  The RV lead impedance and threshold being tested.

 

14:36:53  The RV lead was sutured to the fascia.

 

14:43:26  A DEFIBRILLATOR, INTICA 7 VR-T DX VVE-VDDR was connected and placed in the pocket.

      Second Sponge And Instrument Count Done by Kamari Bonds.

14:46:38  Hypo's: 4, Sponges:20, Bovie/scratch: bovie/scratch

      Sutures: ~SUTURE~, Blades: ~BLADES~, Instruments: ~INSTRU~, Syveck Patches: ~SYVECK PATCH~

14:52:00  Implant Procedure was performed.

14:52:50   A ICD Implant . (Single)

 

14:53:38   A one Joul DFT was performed.

 

14:56:05   The DFT was Success at 25 Joules, 105 Ohms lead impedance and 5 ms charge time.

 

14:57:42   Case End

 

14:57:46   Steri-strips and a sterile dressing applied to site.

 

14:57:50   A sling was placed on the affected arm.

 

14:58:06   No case complications noted.

 

14:58:14   Defibrillator and ground pads removed. Skin intact.

       The Final Sponge And Instrument Count Done by Kamari Bonds.

15:01:34   Hypo's: 4, Sponges: 20, Bovie/scratch: 2

       Sutures: 6, Blades: 2, Instruments: 26, Syveck Patches: 0

 

 

 

 

End Study - Contrast Media Used In Study

Contrast        Total Opened (mL)     Total Used (mL)    Total Wasted (mL)

 

Omnipaque       10            10          0

 

End Study - Maximum Contrast Load

Max Contrast Load (mL)

 

712.1

 

End Study - Radiation Exposure

Fluoro Time

(minutes)

5.1

 

 

End Study - Patient Disposition

Complications     Transferred To

 

           Telemetry Bed

## 2021-05-10 ENCOUNTER — APPOINTMENT (RX ONLY)
Dept: URBAN - METROPOLITAN AREA CLINIC 52 | Facility: CLINIC | Age: 61
Setting detail: DERMATOLOGY
End: 2021-05-10

## 2021-05-10 VITALS — TEMPERATURE: 96.8 F

## 2021-05-10 DIAGNOSIS — D22 MELANOCYTIC NEVI: ICD-10-CM | Status: STABLE

## 2021-05-10 DIAGNOSIS — B35.1 TINEA UNGUIUM: ICD-10-CM | Status: WORSENING

## 2021-05-10 DIAGNOSIS — L85.3 XEROSIS CUTIS: ICD-10-CM | Status: STABLE

## 2021-05-10 DIAGNOSIS — L91.8 OTHER HYPERTROPHIC DISORDERS OF THE SKIN: ICD-10-CM

## 2021-05-10 DIAGNOSIS — L81.4 OTHER MELANIN HYPERPIGMENTATION: ICD-10-CM | Status: STABLE

## 2021-05-10 DIAGNOSIS — D18.0 HEMANGIOMA: ICD-10-CM | Status: STABLE

## 2021-05-10 DIAGNOSIS — L57.8 OTHER SKIN CHANGES DUE TO CHRONIC EXPOSURE TO NONIONIZING RADIATION: ICD-10-CM | Status: STABLE

## 2021-05-10 DIAGNOSIS — B07.8 OTHER VIRAL WARTS: ICD-10-CM

## 2021-05-10 DIAGNOSIS — L82.1 OTHER SEBORRHEIC KERATOSIS: ICD-10-CM | Status: STABLE

## 2021-05-10 PROBLEM — D22.5 MELANOCYTIC NEVI OF TRUNK: Status: ACTIVE | Noted: 2021-05-10

## 2021-05-10 PROBLEM — D18.01 HEMANGIOMA OF SKIN AND SUBCUTANEOUS TISSUE: Status: ACTIVE | Noted: 2021-05-10

## 2021-05-10 PROCEDURE — ? TREATMENT REGIMEN

## 2021-05-10 PROCEDURE — 17110 DESTRUCTION B9 LES UP TO 14: CPT

## 2021-05-10 PROCEDURE — ? ADDITIONAL NOTES

## 2021-05-10 PROCEDURE — ? LIQUID NITROGEN

## 2021-05-10 PROCEDURE — ? FULL BODY SKIN EXAM

## 2021-05-10 PROCEDURE — ? SUNSCREEN TREATMENT REGIMEN

## 2021-05-10 PROCEDURE — ? SUNSCREEN RECOMMENDATIONS

## 2021-05-10 PROCEDURE — 99204 OFFICE O/P NEW MOD 45 MIN: CPT | Mod: 25

## 2021-05-10 PROCEDURE — ? PRESCRIPTION MEDICATION MANAGEMENT

## 2021-05-10 PROCEDURE — ? COUNSELING

## 2021-05-10 ASSESSMENT — LOCATION SIMPLE DESCRIPTION DERM
LOCATION SIMPLE: RIGHT GREAT TOE
LOCATION SIMPLE: LEFT PRETIBIAL REGION
LOCATION SIMPLE: LEFT CHEEK
LOCATION SIMPLE: SCALP
LOCATION SIMPLE: ABDOMEN
LOCATION SIMPLE: POSTERIOR NECK
LOCATION SIMPLE: RIGHT FOREHEAD
LOCATION SIMPLE: RIGHT PRETIBIAL REGION
LOCATION SIMPLE: POSTERIOR SCALP
LOCATION SIMPLE: RIGHT UPPER BACK
LOCATION SIMPLE: LEFT GREAT TOE
LOCATION SIMPLE: UPPER BACK

## 2021-05-10 ASSESSMENT — LOCATION DETAILED DESCRIPTION DERM
LOCATION DETAILED: LEFT PROXIMAL PRETIBIAL REGION
LOCATION DETAILED: RIGHT GREAT TOENAIL
LOCATION DETAILED: RIGHT INFERIOR POSTERIOR NECK
LOCATION DETAILED: RIGHT PROXIMAL PRETIBIAL REGION
LOCATION DETAILED: RIGHT SUPERIOR FOREHEAD
LOCATION DETAILED: LEFT INFERIOR CENTRAL MALAR CHEEK
LOCATION DETAILED: LEFT RIB CAGE
LOCATION DETAILED: SUPERIOR THORACIC SPINE
LOCATION DETAILED: RIGHT RIB CAGE
LOCATION DETAILED: EPIGASTRIC SKIN
LOCATION DETAILED: LEFT DISTAL PLANTAR GREAT TOE
LOCATION DETAILED: RIGHT SUPERIOR UPPER BACK
LOCATION DETAILED: LEFT MEDIAL MALAR CHEEK
LOCATION DETAILED: POSTERIOR MID-PARIETAL SCALP
LOCATION DETAILED: LEFT SUPERIOR PARIETAL SCALP

## 2021-05-10 ASSESSMENT — LOCATION ZONE DERM
LOCATION ZONE: SCALP
LOCATION ZONE: TRUNK
LOCATION ZONE: TOE
LOCATION ZONE: NECK
LOCATION ZONE: TOENAIL
LOCATION ZONE: LEG
LOCATION ZONE: FACE

## 2021-05-10 NOTE — PROCEDURE: PRESCRIPTION MEDICATION MANAGEMENT
Detail Level: Zone
Plan: Recommended ciclopirox solution QD. Patient declined treatment today.
Render In Strict Bullet Format?: No

## 2021-05-10 NOTE — PROCEDURE: LIQUID NITROGEN
Post-Care Instructions: I reviewed with the patient in detail post-care instructions. Patient is to wear sunprotection, and avoid picking at any of the treated lesions. Pt may apply Vaseline to crusted or scabbing areas.
Medical Necessity Clause: This procedure was medically necessary because the lesions that were treated were:inflamed
Render Post-Care Instructions In Note?: no
Consent: The patient's consent was obtained including but not limited to risks of crusting, scabbing, blistering, scarring, darker or lighter pigmentary change, recurrence, incomplete removal and infection.
Medical Necessity Information: It is in your best interest to select a reason for this procedure from the list below. All of these items fulfill various CMS LCD requirements except the new and changing color options.
Detail Level: Detailed
Include Z78.9 (Other Specified Conditions Influencing Health Status) As An Associated Diagnosis?: Yes